# Patient Record
(demographics unavailable — no encounter records)

---

## 2024-10-11 NOTE — RISK ASSESSMENT
[No, patient denies ideation or behavior] : No, patient denies ideation or behavior [No] : No [FreeTextEntry9] :  Pt denied a/v/h, denied s/h/i, denied NSSIB, and denied recent use of substances.

## 2024-10-11 NOTE — REASON FOR VISIT
[Other Location: e.g. Home (Enter Location, City,State)___] : The provider was located at [unfilled]. [Patient with collateral] : Patient with collateral  [Family Member] : family member [Patient preference] : as per patient preference [Telehealth (audio & video) - Individual/Group] : This visit was provided via telehealth using real-time 2-way audio visual technology. [Home] : The patient, [unfilled], was located at home, [unfilled], at the time of the visit. [Other: ______] : provided by MIKHAIL [Time Spent: ____ minutes] : Total time spent using  services: [unfilled] minutes. The patient's primary language is not English thus required  services. [FreeTextEntry4] : 10:23 AM [FreeTextEntry5] : 10:51 AM [Interpreters_IDNumber] : [id number] [Interpreters_FullName] : Palmer [TWNoteComboBox1] : Macedonian [TextBox_17] : Cyndie Morgan, Daughter, [phone]  [FreeTextEntry1] : Psychotherapy follow-up visit with the treatment diagnoses of  1) (311) (F32.A) Depression 2) (300.01) (F41.0) Panic disorder

## 2024-10-11 NOTE — PLAN
[[facility]] : [facility]  [Motivational Interviewing] : Motivational Interviewing  [Psychoeducation] : Psychoeducation  [Supportive Therapy] : Supportive Therapy [FreeTextEntry2] : Treatment Goal (effective as of 4/8/24):  Barbara will gain x3 triggers and x3 coping skills to regulate emotions adequately and communicate her thoughts and feelings effectively to others.  Objective A. Pt will use x1-3 coping skills daily to regulate thoughts and emotions.  Objective B. Pt will review mood-altering events in session every 1-4 weeks to process triggers and coping skill applications to gain self-awareness and insights.  Objective C. Pt will maintain medication management as prescribed daily and attend all scheduled appointments (Medication Management: Every 1-3 months & Individual Therapy: Every 1-4 weeks).  [de-identified] : Barbara confirmed taking medications as prescribed daily and attending all scheduled appointments (medication management every 1-3 months & individual therapy every 1-3 weeks). Since the previous session, Barbara reported feeling better and denied having any significant mood-altering events. We discussed the helping elements. Barbara reported practicing the breathing exercises more regularly and trying to have positive thoughts. She denied having any specific anxiety-provoking thoughts. Also, she began to sleep over at her daughter's house and talk more with her family in [place]. The therapist validated her efforts and highlighted support and love from her family to her using reflective listening. Barbara will continue with 4-4-6 tension-release breathing exercises (daily, x3-5), TIPs skills (using cold temperature) when feeling anxious, and use the mantra, "I am not stuck," x3-5 daily to expand her approaches and attitudes toward her work stress. We will discuss the outcome and other helping elements in the next session.    Plan/Practice Task(s) in Progress: - Tension-release breathing exercises 4-4-6 (x2 daily): Inhale for 4 seconds, hold the air for 4 seconds, exhale for 4 seconds). - TIPPS (Temperature, cold): Putting ice on the back of her neck, holding ice, washing face with cold water - Problem-solving skills: Reviewing the list of Pros vs. Cons - Using the mantra, "I am not stuck," x3-5 daily to expand her approaches and attitudes toward her work stress.    Plan/Practice Task(s) in Progress: - Tension-release breathing exercises 4-4-6 (x2 daily): Inhale for 4 seconds, hold the air for 4 seconds, exhale for 4 seconds).  - TIPPS (Temperature, cold): Putting ice on the back of neck, holding ice, washing face with cold water - Problem-solving skills: Reviewing the list of Pros vs. Cons  Skills Training (all types):  - Tension-release breathing exercises 4-4-6 (x2 daily): Inhale for 4 seconds, hold the air for 4 seconds, exhale for 4 seconds).  - Problem-solving skills: Considering options by brainstorming - Using self-talk to self-soothe and prioritize self-care - Threat appraisal skills:1) Big danger? 2) Catastrophic possibilities? 3) More than I can cope with (consider past positive outcomes)?   Recommended services & referrals made: - 3/25/24: [facility] [phone], Pt was receptive  Support Network (established contact consent/PHI Release): - Emergency and Collateral Contact: Cyndie Morgan, Daughter, [phone]   Follow-up: - Return in 4 week(s).

## 2024-10-28 NOTE — DISCUSSION/SUMMARY
[FreeTextEntry1] : Patient is a 56-year-old female, single, originally from [place] but living in the [place] since 2004, Ukrainian-speaking, has two adult children (son in [place] and daughter in [place]), currently domiciled with daughter and family, formerly employed as a nanny, per chart w/ PMHx of diabetes and obstructive sleep apnea not on CPAP/BiPAP, w/ PPHx of 1 [facility]PP admission in February 2024 for suspected suicide attempt (OD on nine Tylenol PM - patient denies it was a SA), medication trial history of Paxil 10 mg for unclear time period prior to IPP admission in 2024, per chart no known substance use history, no family psychiatric history, who presents to [facility] for continuing follow-up for medication management.  On continued psychiatric evaluation, patient is endorsing improving mood and improving anxiety in the context of ADLs and stress at work and in the home. Denies suicidal, psychotic, manic, delusional, or paranoid ideations. Patient's panic attacks improved per patient. She reported some increased anxiety and headaches on Zoloft, and dry mouth from Paxil, which influenced her decisions to stop taking the medication. Discussed risks and benefits of recurrence of mood and or anxiety symptoms without medication and patient expressed understanding was started on 5mg of Lexapro to target symptoms. Plan to continue Lexapro at this time. Plan to also continue longitudinal care at the clinic with psychotherapy at this time. Patient is stable for outpatient level of care.

## 2024-10-28 NOTE — PLAN
[No] : No [Medication education provided] : Medication education provided. [FreeTextEntry5] : - c/w Lexapro 5mg PO daily ---Self discontinued Zoloft (and Trazodone) due to reported side effects amongst reported improved in mood and anxiety  ---Was initially discharged on Paxil from [facility] in 2024, but reported side effects so was cross tapered to Zoloft at [facility] - c/w psychotherapy with therapist at the clinic - Follow up appointment in 4 weeks (Sept 26th at 9AM)

## 2024-10-28 NOTE — HISTORY OF PRESENT ILLNESS
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 [FreeTextEntry2] : Past psychiatric history of 1 IPP admission in February 2024 for suspected suicide attempt, prior suicide history of one suspected suicide attempt, medication trial history of Paxil 10 mg for unclear time period prior to IPP admission.  Patient states she was started on Paxil 10 mg for sleep a few years ago (exact time unknown). States she started Paxil coinciding with also starting to have panic attacks around 3 years ago, which she describes as a feeling of increased anxiety, dry mouth, heart beating fast, difficulty breathing. States her panic attacks occur daily. Denies knowing what the triggers are. States they started when she was preparing to travel to [place]. Reports Paxil had initially helped her anxiety and panic attacks but there were times when she took it daily, felt better, and then stopped it which caused rebound anxiety. Denies any other medication trials.   [FreeTextEntry3] : Paxil 10 mg (few years, did not always take daily), Paxil 20 mg (on 20 mg for about 2 weeks). Zoloft 50 mg (on for about 7 weeks; experienced increased anxiety and headaches)

## 2024-10-28 NOTE — PHYSICAL EXAM
[Well groomed] : well groomed [Intermittent] : intermittent [Cooperative] : cooperative [Euthymic] : euthymic [Full] : full [Soft] : soft [Linear/Goal Directed] : linear/goal directed [None] : none [None Reported] : none reported [Average] : average [WNL] : within normal limits [FreeTextEntry8] : "everythings good" [de-identified] : has a negative opinion of medication for psychiatric d/o, seemingly due to stigma

## 2024-11-01 NOTE — REASON FOR VISIT
[Patient preference] : as per patient preference [Telehealth (audio & video) - Individual/Group] : This visit was provided via telehealth using real-time 2-way audio visual technology. [Other Location: e.g. Home (Enter Location, City,State)___] : The provider was located at [unfilled]. [Home] : The patient, [unfilled], was located at home, [unfilled], at the time of the visit. [Verbal consent obtained from patient/other participant(s)] : Verbal consent for telehealth/telephonic services obtained from patient/other participant(s) [Patient] : Patient [Other: ______] : provided by MIKHAIL [Time Spent: ____ minutes] : Total time spent using  services: [unfilled] minutes. The patient's primary language is not English thus required  services. [FreeTextEntry4] : 11:04 AM [FreeTextEntry5] : 11:40 AM [TWNoteComboBox1] : Kinyarwanda [Interpreters_IDNumber] : [id number] [Interpreters_FullName] : "Kayce" [FreeTextEntry3] : Guinean  [FreeTextEntry1] : Psychotherapy follow-up visit with the treatment diagnoses of  1) (311) (F32.A) Depression 2) (300.01) (F41.0) Panic disorder

## 2024-11-01 NOTE — PLAN
[[facility]] : [facility]  [Motivational Interviewing] : Motivational Interviewing  [Psychoeducation] : Psychoeducation  [Supportive Therapy] : Supportive Therapy [FreeTextEntry2] : Treatment Goal (effective as of 4/8/24):  Barbara will gain x3 triggers and x3 coping skills to regulate emotions adequately and communicate her thoughts and feelings effectively to others.  Objective A. Pt will use x1-3 coping skills daily to regulate thoughts and emotions.  Objective B. Pt will review mood-altering events in session every 1-4 weeks to process triggers and coping skill applications to gain self-awareness and insights.  Objective C. Pt will maintain medication management as prescribed daily and attend all scheduled appointments (Medication Management: Every 1-3 months & Individual Therapy: Every 1-4 weeks).  [de-identified] : Barbara confirmed taking medications as prescribed daily and attending all scheduled appointments (medication management every 1-3 months & individual therapy every 1-3 weeks). Since the previous session, Barbara reported feeling better and denied having any significant mood-altering events. We reviewed the helping elements to acknowledge her progress. Barbara reported practicing the breathing exercises more regularly and using the mantra we had discussed in the previous session, "I am not stuck, I am making my own decisions, etc." We briefly discussed her plan for her current job when her boss moved to [place], which also empowered her. Also, she continues sleeping over at her daughter's house and talking more with her family in [place]. The therapist validated her efforts and highlighted her ability to redirect her worrying thoughts. Barbara will continue with 4-4-6 tension-release breathing exercises (daily, x3-5), TIPs skills (using cold temperature) when feeling anxious, and use the mantra, "I am not stuck," x3-5 daily to expand her approaches and attitudes toward her work stress. We will discuss her progress and other helping elements in the next session.    Plan/Practice Task(s) in Progress: - Tension-release breathing exercises 4-4-6 (x2 daily): Inhale for 4 seconds, hold the air for 4 seconds, exhale for 4 seconds). - TIPPS (Temperature, cold): Putting ice on the back of her neck, holding ice, washing face with cold water - Problem-solving skills: Reviewing the list of Pros vs. Cons - Using the mantra, "I am not stuck," x3-5 daily to expand her approaches and attitudes toward her work stress.    Plan/Practice Task(s) in Progress: - Tension-release breathing exercises 4-4-6 (x2 daily): Inhale for 4 seconds, hold the air for 4 seconds, exhale for 4 seconds).  - TIPPS (Temperature, cold): Putting ice on the back of neck, holding ice, washing face with cold water - Problem-solving skills: Reviewing the list of Pros vs. Cons  Skills Training (all types):  - Tension-release breathing exercises 4-4-6 (x2 daily): Inhale for 4 seconds, hold the air for 4 seconds, exhale for 4 seconds).  - Problem-solving skills: Considering options by brainstorming - Using self-talk to self-soothe and prioritize self-care - Threat appraisal skills:1) Big danger? 2) Catastrophic possibilities? 3) More than I can cope with (consider past positive outcomes)?   Recommended services & referrals made: - 3/25/24: [facility] [phone], Pt was receptive - Legal services via C[employer]: [employer],  [facility], [facility]  Support Network (established contact consent/PHI Release): - Emergency and Collateral Contact: Cyndie Morgan, Daughter, [phone]   Follow-up: - Return in 4 week(s).

## 2024-11-01 NOTE — END OF VISIT
[Duration of Psychotherapy Visit (minutes spent in synchronous communication): ____] : Duration of Psychotherapy Visit (minutes spent in synchronous communication): [unfilled] [Individual Psychotherapy for 16-37 minutes] : Individual Psychotherapy for 16-37 minutes [Licensed Clinician] : Licensed Clinician [Screening Provided] : Screening provided [FreeTextEntry1] : GAD[facility]

## 2024-11-25 NOTE — PLAN
[[facility]] : [facility]  [Motivational Interviewing] : Motivational Interviewing  [Psychoeducation] : Psychoeducation  [Supportive Therapy] : Supportive Therapy [FreeTextEntry2] : Treatment Goal (effective as of 4/8/24):  Barbara will gain x3 triggers and x3 coping skills to regulate emotions adequately and communicate her thoughts and feelings effectively to others.  Objective A. Pt will use x1-3 coping skills daily to regulate thoughts and emotions.  Objective B. Pt will review mood-altering events in session every 1-4 weeks to process triggers and coping skill applications to gain self-awareness and insights.  Objective C. Pt will maintain medication management as prescribed daily and attend all scheduled appointments (Medication Management: Every 1-3 months & Individual Therapy: Every 1-4 weeks).  [de-identified] : Barbara is building self-awareness to identify x3 triggers and x3 coping skills that she can use to manage her thoughts and emotions to communicate her thoughts and feelings properly with others. She reported improved communication with her family but still struggles to express her needs at work. Today, we continued to discuss her work-related stressors and coping skills to set healthy boundaries with her boss. The therapist used open-ended questions, reflective listening, and validation to support the patient in openly discussing recent events at work, mainly when her boss added new/challenging work tasks. We discussed her concerns, including safety-related issues and the consequences of her medical conditions. We reviewed the helping thoughts/mantra she had created in the previous session, "I am not stuck, I am making my own decisions, etc."  Barbara showed making progress toward her treatment goal by reporting feeling better overall, PHQ9 score showing a low score, using x1-2 coping skills to reduce bodily tension (breathing exercises, self-talk, etc.), taking medications as prescribed daily, and attending all scheduled appointments (medication management every 1-3 months & individual therapy every 1-3 weeks). Barbara will continue with 4-4-6 tension-release breathing exercises (daily, x3-5), TIPs skills (using cold temperature) when feeling anxious, and use the mantra, "I am not stuck," x3-5 daily to expand her approaches and attitudes toward her work stress. We will discuss her progress and other helping elements in the next session.   Plan/Practice Task(s) in Progress: - Tension-release breathing exercises 4-4-6 (x2 daily): Inhale for 4 seconds, hold the air for 4 seconds, exhale for 4 seconds). - TIPPS (Temperature, cold): Putting ice on the back of her neck, holding ice, washing face with cold water - Problem-solving skills: Reviewing the list of Pros vs. Cons - Using the mantra, "I am not stuck," x3-5 daily to expand her approaches and attitudes toward her work stress.    Plan/Practice Task(s) in Progress: - Tension-release breathing exercises 4-4-6 (x2 daily): Inhale for 4 seconds, hold the air for 4 seconds, exhale for 4 seconds).  - TIPPS (Temperature, cold): Putting ice on the back of neck, holding ice, washing face with cold water - Problem-solving skills: Reviewing the list of Pros vs. Cons  Skills Training (all types):  - Tension-release breathing exercises 4-4-6 (x2 daily): Inhale for 4 seconds, hold the air for 4 seconds, exhale for 4 seconds).  - Problem-solving skills: Considering options by brainstorming - Using self-talk to self-soothe and prioritize self-care - Threat appraisal skills:1) Big danger? 2) Catastrophic possibilities? 3) More than I can cope with (consider past positive outcomes)?   Recommended services & referrals made: - 3/25/24: [facility] [phone], Pt was receptive - Legal services via C[employer]: [employer],  [employer], [place] [facility]  Support Network (established contact consent/PHI Release): - Emergency and Collateral Contact: Cyndie Morgan, Daughter, [phone]   Follow-up: - Return in 3 week(s).

## 2024-11-25 NOTE — RISK ASSESSMENT
[No, patient denies ideation or behavior] : No, patient denies ideation or behavior [No] : No [FreeTextEntry9] :  Pt denied a/v/h, denied s/h/i, denied NSSIB, and denied recent use of substances. Show How Many Months Of Anticipated Therapy Are Left: No

## 2024-11-25 NOTE — END OF VISIT
[Screening Provided] : Screening provided [Duration of Psychotherapy Visit (minutes spent in synchronous communication): ____] : Duration of Psychotherapy Visit (minutes spent in synchronous communication): [unfilled] [Licensed Clinician] : Licensed Clinician [Individual Psychotherapy for 38-52 minutes] : Individual Psychotherapy for 38 - 52 minutes [FreeTextEntry1] : PHQ9

## 2024-11-25 NOTE — PLAN
[[facility]] : [facility]  [Motivational Interviewing] : Motivational Interviewing  [Psychoeducation] : Psychoeducation  [Supportive Therapy] : Supportive Therapy [FreeTextEntry2] : Treatment Goal (effective as of 4/8/24):  Barbara will gain x3 triggers and x3 coping skills to regulate emotions adequately and communicate her thoughts and feelings effectively to others.  Objective A. Pt will use x1-3 coping skills daily to regulate thoughts and emotions.  Objective B. Pt will review mood-altering events in session every 1-4 weeks to process triggers and coping skill applications to gain self-awareness and insights.  Objective C. Pt will maintain medication management as prescribed daily and attend all scheduled appointments (Medication Management: Every 1-3 months & Individual Therapy: Every 1-4 weeks).  [de-identified] : Barbara is building self-awareness to identify x3 triggers and x3 coping skills that she can use to manage her thoughts and emotions to communicate her thoughts and feelings properly with others. She reported improved communication with her family but still struggles to express her needs at work. Today, we continued to discuss her work-related stressors and coping skills to set healthy boundaries with her boss. The therapist used open-ended questions, reflective listening, and validation to support the patient in openly discussing recent events at work, mainly when her boss added new/challenging work tasks. We discussed her concerns, including safety-related issues and the consequences of her medical conditions. We reviewed the helping thoughts/mantra she had created in the previous session, "I am not stuck, I am making my own decisions, etc."  Barbara showed making progress toward her treatment goal by reporting feeling better overall, PHQ9 score showing a low score, using x1-2 coping skills to reduce bodily tension (breathing exercises, self-talk, etc.), taking medications as prescribed daily, and attending all scheduled appointments (medication management every 1-3 months & individual therapy every 1-3 weeks). Barbara will continue with 4-4-6 tension-release breathing exercises (daily, x3-5), TIPs skills (using cold temperature) when feeling anxious, and use the mantra, "I am not stuck," x3-5 daily to expand her approaches and attitudes toward her work stress. We will discuss her progress and other helping elements in the next session.   Plan/Practice Task(s) in Progress: - Tension-release breathing exercises 4-4-6 (x2 daily): Inhale for 4 seconds, hold the air for 4 seconds, exhale for 4 seconds). - TIPPS (Temperature, cold): Putting ice on the back of her neck, holding ice, washing face with cold water - Problem-solving skills: Reviewing the list of Pros vs. Cons - Using the mantra, "I am not stuck," x3-5 daily to expand her approaches and attitudes toward her work stress.    Plan/Practice Task(s) in Progress: - Tension-release breathing exercises 4-4-6 (x2 daily): Inhale for 4 seconds, hold the air for 4 seconds, exhale for 4 seconds).  - TIPPS (Temperature, cold): Putting ice on the back of neck, holding ice, washing face with cold water - Problem-solving skills: Reviewing the list of Pros vs. Cons  Skills Training (all types):  - Tension-release breathing exercises 4-4-6 (x2 daily): Inhale for 4 seconds, hold the air for 4 seconds, exhale for 4 seconds).  - Problem-solving skills: Considering options by brainstorming - Using self-talk to self-soothe and prioritize self-care - Threat appraisal skills:1) Big danger? 2) Catastrophic possibilities? 3) More than I can cope with (consider past positive outcomes)?   Recommended services & referrals made: - 3/25/24: [facility] [phone], Pt was receptive - Legal services via [employer]: [employer],  [employer], [place] [facility]  Support Network (established contact consent/PHI Release): - Emergency and Collateral Contact: Cyndie Morgan, Daughter, [phone]   Follow-up: - Return in 3 week(s).

## 2024-11-25 NOTE — REASON FOR VISIT
[Patient preference] : as per patient preference [Telehealth (audio & video) - Individual/Group] : This visit was provided via telehealth using real-time 2-way audio visual technology. [Other Location: e.g. Home (Enter Location, City,State)___] : The provider was located at [unfilled]. [Home] : The patient, [unfilled], was located at home, [unfilled], at the time of the visit. [Verbal consent obtained from patient/other participant(s)] : Verbal consent for telehealth/telephonic services obtained from patient/other participant(s) [Patient] : Patient [Other: ______] : provided by MIKHAIL [FreeTextEntry4] : 11:00 AM [FreeTextEntry5] : 11:50 AM [TWNoteComboBox1] : Maori [Interpreters_IDNumber] : [id number] [Interpreters_FullName] : "Kayce" [FreeTextEntry3] : Afghan  [FreeTextEntry1] : Psychotherapy follow-up visit with the treatment diagnoses of  1) (311) (F32.A) Depression 2) (300.01) (F41.0) Panic disorder

## 2024-11-25 NOTE — REASON FOR VISIT
[Patient preference] : as per patient preference [Telehealth (audio & video) - Individual/Group] : This visit was provided via telehealth using real-time 2-way audio visual technology. [Other Location: e.g. Home (Enter Location, City,State)___] : The provider was located at [unfilled]. [Home] : The patient, [unfilled], was located at home, [unfilled], at the time of the visit. [Verbal consent obtained from patient/other participant(s)] : Verbal consent for telehealth/telephonic services obtained from patient/other participant(s) [Patient] : Patient [Other: ______] : provided by MIKHAIL [FreeTextEntry4] : 11:00 AM [FreeTextEntry5] : 11:50 AM [TWNoteComboBox1] : English [Interpreters_IDNumber] : [id number] [Interpreters_FullName] : "Kayce" [FreeTextEntry3] : Stateless  [FreeTextEntry1] : Psychotherapy follow-up visit with the treatment diagnoses of  1) (311) (F32.A) Depression 2) (300.01) (F41.0) Panic disorder

## 2024-12-05 NOTE — ASSESSMENT
[FreeTextEntry1] : big improvement in diabetes control, but ldl not at goal, and A1C 7.3, will adjust meds, try increasing ozempic to 2 mg. weekly.

## 2024-12-12 NOTE — REASON FOR VISIT
[Patient preference] : as per patient preference [Telehealth (audio & video) - Individual/Group] : This visit was provided via telehealth using real-time 2-way audio visual technology. [Other Location: e.g. Home (Enter Location, City,State)___] : The provider was located at [unfilled]. [Home] : The patient, [unfilled], was located at home, [unfilled], at the time of the visit. [Verbal consent obtained from patient/other participant(s)] : Verbal consent for telehealth/telephonic services obtained from patient/other participant(s) [Patient] : Patient [Other: ______] : provided by MIKHAIL [Time Spent: ____ minutes] : Total time spent using  services: [unfilled] minutes. The patient's primary language is not English thus required  services. [FreeTextEntry4] : 11:27 AM [FreeTextEntry5] : 12:14 AM [TWNoteComboBox1] : Italian [Interpreters_IDNumber] : [id number] [FreeTextEntry3] : Citizen of [place]  [Interpreters_FullName] : "Kayce" [FreeTextEntry1] : Psychotherapy follow-up visit with the treatment diagnoses of  1) (311) (F32.A) Depression 2) (300.01) (F41.0) Panic disorder

## 2024-12-12 NOTE — END OF VISIT
[Screening Provided] : Screening provided [Duration of Psychotherapy Visit (minutes spent in synchronous communication): ____] : Duration of Psychotherapy Visit (minutes spent in synchronous communication): [unfilled] [Individual Psychotherapy for 38-52 minutes] : Individual Psychotherapy for 38 - 52 minutes [Licensed Clinician] : Licensed Clinician [FreeTextEntry1] : PHQ9

## 2024-12-12 NOTE — PLAN
[[facility]] : [facility]  [Motivational Interviewing] : Motivational Interviewing  [Psychoeducation] : Psychoeducation  [Supportive Therapy] : Supportive Therapy [FreeTextEntry2] : Treatment Goal (effective as of 4/8/24):  Barbara will identify x3 triggers and x3 coping skills to regulate emotions adequately and communicate her thoughts and feelings effectively to others.  Objective A. Pt will use x1-3 coping skills daily to regulate thoughts and emotions.  Objective B. Pt will review mood-altering events in session every 1-4 weeks to process triggers and coping skill applications to gain self-awareness and insights.  Objective C. Pt will maintain medication management as prescribed daily and attend all scheduled appointments (Medication Management: Every 1-3 months & Individual Therapy: Every 1-4 weeks).  [de-identified] : Barbara is building insights to identify x3 triggers and x3 coping skills that she can use to manage her thoughts and emotions, and to communicate her thoughts and feelings with others. The patient wants to process her emotions and thoughts with the therapist to improve her insights and strengthen her emotional resilience. Her primary stressors are relational conflicts and setting healthy boundaries.   Today, Barbara reported overall better mood but was concerned about her recent forgetfulness and confusion. For example, Barbara was scheduled for an 11:15 AM in-person appointment with the therapist, but she got confused with the appointments. She came to the clinic around 9 AM to see Dr. York. We switched the session from in-person to telehealth after we began our conversation on the phone using  services after she did not show up for our session at the clinic. Barbara expressed having a couple of episodes of getting confused with tasks or directions recently. She agreed to share her concerns with Dr. York during the session on Monday, 12/16/24. We discussed coping strategies to organize her appointments and essential tasks using visual tools, such as paper calendars, post-its, taking notes, etc.   Barbara showed making progress toward her treatment goal by reporting feeling better overall, PHQ9 score showing a low score, using x1-2 coping skills to reduce bodily tension (breathing exercises, self-talk, etc.), taking medications as prescribed daily, and attending all scheduled appointments (medication management every 1-3 months & individual therapy every 1-3 weeks). Barbara will continue with 4-4-6 tension-release breathing exercises (daily, x3-5), TIPs skills (using cold temperature) when feeling anxious, and use the mantra, "I am not stuck," x3-5 daily to expand her approaches and attitudes toward her work stress. We will continue to discuss her recent symptoms in the next session while exploring organizing skills from today's session until our next session.     Plan/Practice Task(s) in Progress: - Tension-release breathing exercises 4-4-6 (x2 daily): Inhale for 4 seconds, hold the air for 4 seconds, exhale for 4 seconds). - TIPPS (Temperature, cold): Putting ice on the back of her neck, holding ice, washing face with cold water - Problem-solving skills: Reviewing the list of Pros vs. Cons - Using the mantra, "I am not stuck," x3-5 daily to expand her approaches and attitudes toward her work stress.    Plan/Practice Task(s) in Progress: - Tension-release breathing exercises 4-4-6 (x2 daily): Inhale for 4 seconds, hold the air for 4 seconds, exhale for 4 seconds).  - TIPPS (Temperature, cold): Putting ice on the back of neck, holding ice, washing face with cold water - Problem-solving skills: Reviewing the list of Pros vs. Cons  Skills Training (all types):  - Tension-release breathing exercises 4-4-6 (x2 daily): Inhale for 4 seconds, hold the air for 4 seconds, exhale for 4 seconds).  - Problem-solving skills: Considering options by brainstorming - Using self-talk to self-soothe and prioritize self-care - Threat appraisal skills:1) Big danger? 2) Catastrophic possibilities? 3) More than I can cope with (consider past positive outcomes)?   Recommended services & referrals made: - 3/25/24: [facility] [phone], Pt was receptive - Legal services via [employer]: [employer],  [facility], [facility]  Support Network (established contact consent/PHI Release): - Emergency and Collateral Contact: Cyndie Morgan, Daughter, [phone]   Follow-up: - Return in 3 week(s).

## 2024-12-12 NOTE — PLAN
[[facility]] : [facility]  [Motivational Interviewing] : Motivational Interviewing  [Psychoeducation] : Psychoeducation  [Supportive Therapy] : Supportive Therapy [FreeTextEntry2] : Treatment Goal (effective as of 4/8/24):  Barbara will identify x3 triggers and x3 coping skills to regulate emotions adequately and communicate her thoughts and feelings effectively to others.  Objective A. Pt will use x1-3 coping skills daily to regulate thoughts and emotions.  Objective B. Pt will review mood-altering events in session every 1-4 weeks to process triggers and coping skill applications to gain self-awareness and insights.  Objective C. Pt will maintain medication management as prescribed daily and attend all scheduled appointments (Medication Management: Every 1-3 months & Individual Therapy: Every 1-4 weeks).  [de-identified] : Barbara is building insights to identify x3 triggers and x3 coping skills that she can use to manage her thoughts and emotions, and to communicate her thoughts and feelings with others. The patient wants to process her emotions and thoughts with the therapist to improve her insights and strengthen her emotional resilience. Her primary stressors are relational conflicts and setting healthy boundaries.   Today, Barbara reported overall better mood but was concerned about her recent forgetfulness and confusion. For example, Barbara was scheduled for an 11:15 AM in-person appointment with the therapist, but she got confused with the appointments. She came to the clinic around 9 AM to see Dr. York. We switched the session from in-person to telehealth after we began our conversation on the phone using  services after she did not show up for our session at the clinic. Barbara expressed having a couple of episodes of getting confused with tasks or directions recently. She agreed to share her concerns with Dr. York during the session on Monday, 12/16/24. We discussed coping strategies to organize her appointments and essential tasks using visual tools, such as paper calendars, post-its, taking notes, etc.   Barbara showed making progress toward her treatment goal by reporting feeling better overall, PHQ9 score showing a low score, using x1-2 coping skills to reduce bodily tension (breathing exercises, self-talk, etc.), taking medications as prescribed daily, and attending all scheduled appointments (medication management every 1-3 months & individual therapy every 1-3 weeks). Barbara will continue with 4-4-6 tension-release breathing exercises (daily, x3-5), TIPs skills (using cold temperature) when feeling anxious, and use the mantra, "I am not stuck," x3-5 daily to expand her approaches and attitudes toward her work stress. We will continue to discuss her recent symptoms in the next session while exploring organizing skills from today's session until our next session.     Plan/Practice Task(s) in Progress: - Tension-release breathing exercises 4-4-6 (x2 daily): Inhale for 4 seconds, hold the air for 4 seconds, exhale for 4 seconds). - TIPPS (Temperature, cold): Putting ice on the back of her neck, holding ice, washing face with cold water - Problem-solving skills: Reviewing the list of Pros vs. Cons - Using the mantra, "I am not stuck," x3-5 daily to expand her approaches and attitudes toward her work stress.    Plan/Practice Task(s) in Progress: - Tension-release breathing exercises 4-4-6 (x2 daily): Inhale for 4 seconds, hold the air for 4 seconds, exhale for 4 seconds).  - TIPPS (Temperature, cold): Putting ice on the back of neck, holding ice, washing face with cold water - Problem-solving skills: Reviewing the list of Pros vs. Cons  Skills Training (all types):  - Tension-release breathing exercises 4-4-6 (x2 daily): Inhale for 4 seconds, hold the air for 4 seconds, exhale for 4 seconds).  - Problem-solving skills: Considering options by brainstorming - Using self-talk to self-soothe and prioritize self-care - Threat appraisal skills:1) Big danger? 2) Catastrophic possibilities? 3) More than I can cope with (consider past positive outcomes)?   Recommended services & referrals made: - 3/25/24: [facility] [phone], Pt was receptive - Legal services via C[employer]: [employer],  [facility], [facility]  Support Network (established contact consent/PHI Release): - Emergency and Collateral Contact: Cyndie Morgan, Daughter, [phone]   Follow-up: - Return in 3 week(s).

## 2024-12-12 NOTE — PLAN
[[facility]] : [facility]  [Motivational Interviewing] : Motivational Interviewing  [Psychoeducation] : Psychoeducation  [Supportive Therapy] : Supportive Therapy [FreeTextEntry2] : Treatment Goal (effective as of 4/8/24):  Barbara will identify x3 triggers and x3 coping skills to regulate emotions adequately and communicate her thoughts and feelings effectively to others.  Objective A. Pt will use x1-3 coping skills daily to regulate thoughts and emotions.  Objective B. Pt will review mood-altering events in session every 1-4 weeks to process triggers and coping skill applications to gain self-awareness and insights.  Objective C. Pt will maintain medication management as prescribed daily and attend all scheduled appointments (Medication Management: Every 1-3 months & Individual Therapy: Every 1-4 weeks).  [de-identified] : Barbara is building insights to identify x3 triggers and x3 coping skills that she can use to manage her thoughts and emotions, and to communicate her thoughts and feelings with others. The patient wants to process her emotions and thoughts with the therapist to improve her insights and strengthen her emotional resilience. Her primary stressors are relational conflicts and setting healthy boundaries.   Today, Barbara reported overall better mood but was concerned about her recent forgetfulness and confusion. For example, Barbara was scheduled for an 11:15 AM in-person appointment with the therapist, but she got confused with the appointments. She came to the clinic around 9 AM to see Dr. York. We switched the session from in-person to telehealth after we began our conversation on the phone using  services after she did not show up for our session at the clinic. Barbara expressed having a couple of episodes of getting confused with tasks or directions recently. She agreed to share her concerns with Dr. York during the session on Monday, 12/16/24. We discussed coping strategies to organize her appointments and essential tasks using visual tools, such as paper calendars, post-its, taking notes, etc.   Barbara showed making progress toward her treatment goal by reporting feeling better overall, PHQ9 score showing a low score, using x1-2 coping skills to reduce bodily tension (breathing exercises, self-talk, etc.), taking medications as prescribed daily, and attending all scheduled appointments (medication management every 1-3 months & individual therapy every 1-3 weeks). Barbara will continue with 4-4-6 tension-release breathing exercises (daily, x3-5), TIPs skills (using cold temperature) when feeling anxious, and use the mantra, "I am not stuck," x3-5 daily to expand her approaches and attitudes toward her work stress. We will continue to discuss her recent symptoms in the next session while exploring organizing skills from today's session until our next session.     Plan/Practice Task(s) in Progress: - Tension-release breathing exercises 4-4-6 (x2 daily): Inhale for 4 seconds, hold the air for 4 seconds, exhale for 4 seconds). - TIPPS (Temperature, cold): Putting ice on the back of her neck, holding ice, washing face with cold water - Problem-solving skills: Reviewing the list of Pros vs. Cons - Using the mantra, "I am not stuck," x3-5 daily to expand her approaches and attitudes toward her work stress.    Plan/Practice Task(s) in Progress: - Tension-release breathing exercises 4-4-6 (x2 daily): Inhale for 4 seconds, hold the air for 4 seconds, exhale for 4 seconds).  - TIPPS (Temperature, cold): Putting ice on the back of neck, holding ice, washing face with cold water - Problem-solving skills: Reviewing the list of Pros vs. Cons  Skills Training (all types):  - Tension-release breathing exercises 4-4-6 (x2 daily): Inhale for 4 seconds, hold the air for 4 seconds, exhale for 4 seconds).  - Problem-solving skills: Considering options by brainstorming - Using self-talk to self-soothe and prioritize self-care - Threat appraisal skills:1) Big danger? 2) Catastrophic possibilities? 3) More than I can cope with (consider past positive outcomes)?   Recommended services & referrals made: - 3/25/24: [facility] [phone], Pt was receptive - Legal services via CBO: [employer],  [facility], [facility]  Support Network (established contact consent/PHI Release): - Emergency and Collateral Contact: Cyndie Morgan, Daughter, [phone]   Follow-up: - Return in 3 week(s).

## 2024-12-12 NOTE — REASON FOR VISIT
[Patient preference] : as per patient preference [Telehealth (audio & video) - Individual/Group] : This visit was provided via telehealth using real-time 2-way audio visual technology. [Other Location: e.g. Home (Enter Location, City,State)___] : The provider was located at [unfilled]. [Home] : The patient, [unfilled], was located at home, [unfilled], at the time of the visit. [Verbal consent obtained from patient/other participant(s)] : Verbal consent for telehealth/telephonic services obtained from patient/other participant(s) [Patient] : Patient [Other: ______] : provided by MIKHAIL [Time Spent: ____ minutes] : Total time spent using  services: [unfilled] minutes. The patient's primary language is not English thus required  services. [FreeTextEntry4] : 11:27 AM [FreeTextEntry5] : 12:14 AM [TWNoteComboBox1] : Hebrew [Interpreters_IDNumber] : [id number] [Interpreters_FullName] : "Kayce" [FreeTextEntry3] : Vincentian  [FreeTextEntry1] : Psychotherapy follow-up visit with the treatment diagnoses of  1) (311) (F32.A) Depression 2) (300.01) (F41.0) Panic disorder

## 2024-12-12 NOTE — REASON FOR VISIT
[Patient preference] : as per patient preference [Telehealth (audio & video) - Individual/Group] : This visit was provided via telehealth using real-time 2-way audio visual technology. [Other Location: e.g. Home (Enter Location, City,State)___] : The provider was located at [unfilled]. [Home] : The patient, [unfilled], was located at home, [unfilled], at the time of the visit. [Verbal consent obtained from patient/other participant(s)] : Verbal consent for telehealth/telephonic services obtained from patient/other participant(s) [Patient] : Patient [Other: ______] : provided by MIKHAIL [Time Spent: ____ minutes] : Total time spent using  services: [unfilled] minutes. The patient's primary language is not English thus required  services. [FreeTextEntry4] : 11:27 AM [FreeTextEntry5] : 12:14 AM [TWNoteComboBox1] : Uzbek [Interpreters_IDNumber] : [id number] [FreeTextEntry3] : Bermudian  [Interpreters_FullName] : "Kayce" [FreeTextEntry1] : Psychotherapy follow-up visit with the treatment diagnoses of  1) (311) (F32.A) Depression 2) (300.01) (F41.0) Panic disorder

## 2024-12-16 NOTE — PHYSICAL EXAM
[Well groomed] : well groomed [Cooperative] : cooperative [Euthymic] : euthymic [Full] : full [Soft] : soft [Linear/Goal Directed] : linear/goal directed [None] : none [None Reported] : none reported [Average] : average [WNL] : within normal limits [de-identified] : Korean speaking [de-identified] : has a negative opinion of medication for psychiatric d/o, seemingly due to stigma

## 2024-12-16 NOTE — PLAN
[No] : No [Medication education provided] : Medication education provided. [FreeTextEntry5] : - c/w Lexapro 5mg PO daily ---Self discontinued Zoloft (and Trazodone) due to reported side effects amongst reported improved in mood and anxiety  ---Was initially discharged on Paxil from [facility] in 2024, but reported side effects so was cross tapered to Zoloft at [facility] - c/w psychotherapy with therapist at the clinic - Follow up appointment in 8 weeks (Thurs Feb 20th at 9AM)

## 2024-12-16 NOTE — REASON FOR VISIT
[Patient] : Patient [FreeTextEntry1] : Medication management; "I get very scared when I'm alone in the dark on the streets, I panic... I feel lost, I don't know how to explain"

## 2024-12-16 NOTE — HISTORY OF PRESENT ILLNESS
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 [FreeTextEntry2] : Past psychiatric history of 1 [facility] admission in February 2024 for suspected suicide attempt, prior suicide history of one suspected suicide attempt, medication trial history of Paxil 10 mg for unclear time period prior to [facility] admission.  Patient states she was started on Paxil 10 mg for sleep a few years ago (exact time unknown). States she started Paxil coinciding with also starting to have panic attacks around 3 years ago, which she describes as a feeling of increased anxiety, dry mouth, heart beating fast, difficulty breathing. States her panic attacks occur daily. Denies knowing what the triggers are. States they started when she was preparing to travel to [place]. Reports Paxil had initially helped her anxiety and panic attacks but there were times when she took it daily, felt better, and then stopped it which caused rebound anxiety. Denies any other medication trials.   [FreeTextEntry3] : Paxil 10 mg (few years, did not always take daily), Paxil 20 mg (on 20 mg for about 2 weeks). Zoloft 50 mg (on for about 7 weeks; experienced increased anxiety and headaches)

## 2024-12-16 NOTE — DISCUSSION/SUMMARY
[FreeTextEntry1] : Patient is a 57-year-old female, single, originally from [place] but living in the [place] since 2004, Wolof-speaking, has two adult children (son in [place] and daughter in [place]), currently domiciled with daughter and family, formerly employed as a nanny, per chart w/ PMHx of diabetes and obstructive sleep apnea not on CPAP/BiPAP, w/ PPHx of 1 [facility] admission in February 2024 for suspected suicide attempt (OD on nine Tylenol PM - patient denies it was a SA), medication trial history of Paxil 10 mg for unclear time period prior to [facility] admission in 2024, per chart no known substance use history, no family psychiatric history, who presents to [facility] for continuing follow-up for medication management.  On continued psychiatric evaluation, patient is endorsing several episodes of panic since the last visit, but not as severe of frequent as the past. Patient endorses overall improving mood and improving anxiety in the context of ADLs and stress at work and in the home, but does want to continue to address ongoing intermittent panic episodes. Denies suicidal, psychotic, manic, delusional, or paranoid ideations. She reported some increased anxiety and headaches on Zoloft, and dry mouth from Paxil, which influenced her decisions to stop taking the medication. Discussed risks and benefits of recurrence of mood and or anxiety symptoms without medication and patient expressed understanding was started on 5mg of Lexapro to target symptoms. Plan to continue Lexapro at this time. Plan to also continue longitudinal care at the clinic with psychotherapy at this time. Patient is stable for outpatient level of care.

## 2025-02-19 NOTE — HISTORY OF PRESENT ILLNESS
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 [FreeTextEntry2] : Past psychiatric history of 1 [facility] admission in February 2024 for suspected suicide attempt, prior suicide history of one suspected suicide attempt, medication trial history of Paxil 10 mg for unclear time period prior to IPP admission.  Patient states she was started on Paxil 10 mg for sleep a few years ago (exact time unknown). States she started Paxil coinciding with also starting to have panic attacks around 3 years ago, which she describes as a feeling of increased anxiety, dry mouth, heart beating fast, difficulty breathing. States her panic attacks occur daily. Denies knowing what the triggers are. States they started when she was preparing to travel to [place]. Reports Paxil had initially helped her anxiety and panic attacks but there were times when she took it daily, felt better, and then stopped it which caused rebound anxiety. Denies any other medication trials.   [FreeTextEntry3] : Paxil 10 mg (few years, did not always take daily), Paxil 20 mg (on 20 mg for about 2 weeks). Zoloft 50 mg (on for about 7 weeks; experienced increased anxiety and headaches)

## 2025-02-19 NOTE — PLAN
[No] : No [Medication education provided] : Medication education provided. [FreeTextEntry5] : - c/w Lexapro 5mg PO daily ---Self discontinued Zoloft (and Trazodone) due to reported side effects amongst reported improved in mood and anxiety  ---Was initially discharged on Paxil from [facility] in 2024, but reported side effects so was cross tapered to Zoloft at [facility] - c/w psychotherapy with therapist at the clinic - Follow up appointment in 8 weeks (Mon March 17th 2025 930AM)

## 2025-02-19 NOTE — PHYSICAL EXAM
[Well groomed] : well groomed [Cooperative] : cooperative [Euthymic] : euthymic [Full] : full [Soft] : soft [Linear/Goal Directed] : linear/goal directed [None] : none [None Reported] : none reported [Average] : average [WNL] : within normal limits [de-identified] : Irish speaking [de-identified] : has a negative opinion of medication for psychiatric d/o, seemingly due to stigma

## 2025-02-19 NOTE — DISCUSSION/SUMMARY
[FreeTextEntry1] : Patient is a 57-year-old female, single, originally from [place] but living in the [place] since [place], Thai-speaking, has two adult children (son in [place] and daughter in [place]), currently domiciled with daughter and family, formerly employed as a nanny, per chart w/ PMHx of diabetes and obstructive sleep apnea not on CPAP/BiPAP, w/ PPHx of 1 [facility]PP admission in February 2024 for suspected suicide attempt (OD on nine Tylenol PM - patient denies it was a SA), medication trial history of Paxil 10 mg for unclear time period prior to IPP admission in 2024, per chart no known substance use history, no family psychiatric history, who presents to [facility] for continuing follow-up for medication management.  On continued psychiatric evaluation, patient is endorsing several episodes of panic since the last visit, but not as severe of frequent as the past. Patient endorses overall improving mood and improving anxiety in the context of ADLs and stress at work and in the home, but does want to continue to address ongoing intermittent panic episodes. Denies suicidal, psychotic, manic, delusional, or paranoid ideations. She reported some increased anxiety and headaches on Zoloft, and dry mouth from Paxil, which influenced her decisions to stop taking the medication. Discussed risks and benefits of recurrence of mood and or anxiety symptoms without medication and patient expressed understanding was started on 5mg of Lexapro to target symptoms. Plan to continue Lexapro at this time. Plan to also continue longitudinal care at the clinic with psychotherapy at this time. Patient is stable for outpatient level of care.

## 2025-03-17 NOTE — PLAN
[No] : No [Medication education provided] : Medication education provided. [FreeTextEntry5] : - c/w Lexapro 5mg PO daily ---Self discontinued Zoloft (and Trazodone) due to reported side effects amongst reported improved in mood and anxiety  ---Was initially discharged on Paxil from [facility] in 2024, but reported side effects so was cross tapered to Zoloft at [facility]PD - c/w psychotherapy with therapist at the clinic - Follow up appointment in 8 weeks (Mon April 14th 930AM)

## 2025-03-17 NOTE — PHYSICAL EXAM
[Well groomed] : well groomed [Cooperative] : cooperative [Euthymic] : euthymic [Full] : full [Soft] : soft [Linear/Goal Directed] : linear/goal directed [None] : none [None Reported] : none reported [Average] : average [WNL] : within normal limits [de-identified] : Arabic speaking [de-identified] : has a negative opinion of medication for psychiatric d/o, seemingly due to stigma

## 2025-03-17 NOTE — DISCUSSION/SUMMARY
[FreeTextEntry1] : Patient is a 57-year-old female, single, originally from [place] but [place] the [place], Romansh-speaking, has two adult children (son in [place] and daughter in [place]), currently domiciled with daughter and family, formerly employed as a nanny, per chart w/ PMHx of diabetes and obstructive sleep apnea not on CPAP/BiPAP, w/ PPHx of 1 [facility]PP admission in February 2024 for suspected suicide attempt (OD on nine Tylenol PM - patient denies it was a SA), medication trial history of Paxil 10 mg for unclear time period prior to IPP admission in 2024, per chart no known substance use history, no family psychiatric history, who presents to [facility]PD for continuing follow-up for medication management.  On continued psychiatric evaluation, patient endorses overall improving mood and improving anxiety in the context of ADLs and stress at work and in the home, there have been no more panic attacks. Denies suicidal, psychotic, manic, delusional, or paranoid ideations. She reported some increased anxiety and headaches on Zoloft, and dry mouth from Paxil, which influenced her decisions to stop taking the medication. Discussed risks and benefits of recurrence of mood and or anxiety symptoms without medication and patient expressed understanding was started on 5mg of Lexapro to target symptoms, to good effect. Plan to continue Lexapro at this time. Plan to also continue longitudinal care at the clinic with psychotherapy at this time. Patient is stable for outpatient level of care.

## 2025-04-10 NOTE — ASSESSMENT
[FreeTextEntry1] : Joint pain: Suspect most likely that pt has degenerative joint disease, also possibly with overlying fibromyalgia. Her elbow symptoms are suggestive of medial and lateral epicondylitis, and she also likely has muscle tension in the neck and thoracic spine. She does not have any specific symptoms or exam findings of inflammatory arthritis or a systemic connective tissue disease. - f/u x-rays of c-spine, t-spine, shoulders, elbows, hands, l-spine, knees, feet - f/u labs for inflammatory arthritis - Start prn cyclobenzaprine 5 mg for now, discussed side effects  f/u in 2 months, will call pt's daughter with lab and x-ray results
(4) excellent

## 2025-04-10 NOTE — HISTORY OF PRESENT ILLNESS
[FreeTextEntry1] : Since around [id number], pt has been experiencing pain worst in her neck/upper back but also radiating down her low back and R leg, with pain and stiffness in the PIPs, medial and lateral elbow pain, knee and shoulder pain, and pain in the feet in the mornings x 15 minutes when she first stands up. She denies rashes, diarrhea or swelling. Pt tried cold compress and ibuprofen with no improvement. + Intermittent tingling in the hands, knees and neck. Pt denies any triggers or exacerbating factors.   Physical exam: GEN: Pleasant, AAO woman sitting on exam table in NAD SKIN: no rashes HEAD: no alopecia PULM: Clear to auscultation b/l CV: Regular rate and rhythm, no murmurs MSK Neck: + Slightly limited L lateral rotation with pain on L rotation Shoulders: Full ROM b/l, + pain with ROM b/l Elbows: Full ROM b/l, no effusions, + TTP in medial and lateral epicondyles Wrists: Full ROM b/l, no effusions Hands: no synovitis Hips; Full ROM b/l Knees; no effusions, + pain with ROM b/l, full ROM b/l Ankles: no effusions, full ROM b/l Feet: no effusions, no TTP EXT: no nail changes

## 2025-04-14 NOTE — HISTORY OF PRESENT ILLNESS
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 [FreeTextEntry2] : Past psychiatric history of 1 [facility]PP admission in February 2024 for suspected suicide attempt, prior suicide history of one suspected suicide attempt, medication trial history of Paxil 10 mg for unclear time period prior to IPP admission.  Patient states she was started on Paxil 10 mg for sleep a few years ago (exact time unknown). States she started Paxil coinciding with also starting to have panic attacks around 3 years ago, which she describes as a feeling of increased anxiety, dry mouth, heart beating fast, difficulty breathing. States her panic attacks occur daily. Denies knowing what the triggers are. States they started when she was preparing to travel to [place]. Reports Paxil had initially helped her anxiety and panic attacks but there were times when she took it daily, felt better, and then stopped it which caused rebound anxiety. Denies any other medication trials.   [FreeTextEntry3] : Paxil 10 mg (few years, did not always take daily), Paxil 20 mg (on 20 mg for about 2 weeks). Zoloft 50 mg (on for about 7 weeks; experienced increased anxiety and headaches)

## 2025-04-14 NOTE — DISCUSSION/SUMMARY
[FreeTextEntry1] : Patient is a 57-year-old female, single, originally from [place] but [place], Divehi-speaking, has two adult children (son in [place] and daughter in [place]), currently domiciled with daughter and family, formerly employed as a nanny, per chart w/ PMHx of diabetes and obstructive sleep apnea not on CPAP/BiPAP, w/ PPHx of 1 IPP admission in February 2024 for suspected suicide attempt (OD on nine Tylenol PM - patient denies it was a SA), medication trial history of Paxil 10 mg for unclear time period prior to IPP admission in 2024, per chart no known substance use history, no family psychiatric history, who presents to [facility] for continuing follow-up for medication management.  On continued psychiatric evaluation, patient endorses overall improving mood and improving anxiety in the context of ADLs and stress at work and in the home, there have been no more panic attacks. Denies suicidal, psychotic, manic, delusional, or paranoid ideations. She reported some increased anxiety and headaches on Zoloft, and dry mouth from Paxil, which influenced her decisions to stop taking the medication. Discussed risks and benefits of recurrence of mood and or anxiety symptoms without medication and patient expressed understanding was started on 5mg of Lexapro to target symptoms, to good effect. Plan to continue Lexapro at this time. Plan to also continue longitudinal care at the clinic with psychotherapy at this time. Patient is stable for outpatient level of care.

## 2025-04-14 NOTE — PLAN
[No] : No [Medication education provided] : Medication education provided. [FreeTextEntry5] : - increase Lexapro to 10mg (from 5mg) PO daily ---Self discontinued Zoloft (and Trazodone) due to reported side effects amongst reported improved in mood and anxiety  ---Was initially discharged on Paxil from [facility] in 2024, but reported side effects so was cross tapered to Zoloft at [facility] - c/w psychotherapy with therapist at the clinic - Follow up appointment in 6 weeks (Mon May 26th 11AM)

## 2025-04-14 NOTE — PHYSICAL EXAM
[Well groomed] : well groomed [Cooperative] : cooperative [Depressed] : depressed [Labile] : labile [Soft] : soft [Linear/Goal Directed] : linear/goal directed [None] : none [None Reported] : none reported [Average] : average [WNL] : within normal limits [de-identified] : Syriac speaking [de-identified] : has a negative opinion of medication for psychiatric d/o, seemingly due to stigma

## 2025-04-23 NOTE — REASON FOR VISIT
[Time Spent: ____ minutes] : Total time spent using  services: [unfilled] minutes. The patient's primary language is not English thus required  services. [Patient] : Patient [Other: ______] : provided by MIKHAIL [FreeTextEntry4] : 11:00 AM [FreeTextEntry5] : 11:53 AM [TWNoteComboBox1] : Luxembourgish [Interpreters_IDNumber] : [id number] [Interpreters_FullName] : Amisha [FreeTextEntry3] : Algerian  [FreeTextEntry1] : Psychotherapy follow-up visit with the treatment diagnoses of  1) (311) (F32.A) Depression 2) (300.01) (F41.0) Panic disorder

## 2025-04-23 NOTE — RISK ASSESSMENT
[Low acute suicide risk] : Low acute suicide risk [No, patient denies ideation or behavior] : No, patient denies ideation or behavior [No] : No [FreeTextEntry9] :  Pt denied a/v/h, denied s/h/i, denied NSSIB, and denied recent use of substances.

## 2025-04-23 NOTE — PLAN
Subjective:       Patient ID: Deborah Sotelo is a 60 y.o. female.    Chief Complaint: Follow-up    60 year old female with PMHx of DM2, HTN and arthritis of the left shoulder comes in with severe pain of the Left shoulder. Patient have seen Dr. Minaya recently, an MRI of shoulder was done which showed severe DJD in the left shoulder. He gave her a steroid shot which worked only for 2-3 days and the pain came back again. Dr. Minaya has prescribed Mobic to the patient but patient has stomach ulcers and cannot take NSAIDs. She complains of the severe pain that prevents her from sleeping. She teared up during the visit and mentioned how the pain is preventing her from being able to do daily tasks.  She is also pale today. She has Hx of Anemia and she reports being more pale than usual.     Review of Systems   Constitutional: Negative for activity change, appetite change, chills, fatigue and fever.   HENT: Negative for nasal congestion, postnasal drip, rhinorrhea, sinus pressure/congestion, sneezing and sore throat.    Respiratory: Negative for cough, choking, chest tightness and shortness of breath.    Cardiovascular: Negative for chest pain, palpitations, leg swelling and claudication.   Gastrointestinal: Negative for abdominal distention, abdominal pain, blood in stool, constipation, diarrhea, nausea and vomiting.   Genitourinary: Negative for bladder incontinence, decreased urine volume, difficulty urinating, dyspareunia, dysuria, flank pain, genital sores, hematuria, nocturia, urgency, vaginal bleeding, vaginal discharge, vaginal pain and vaginal dryness.   Musculoskeletal: Positive for arthralgias.   Integumentary:  Negative for breast mass and breast discharge.   Neurological: Negative for seizures, light-headedness, numbness, headaches, disturbances in coordination and coordination difficulties.   Hematological: Negative for adenopathy.   Psychiatric/Behavioral: Negative for agitation.   All other systems  reviewed and are negative.  Breast: Negative for mass        Objective:      Physical Exam  Vitals and nursing note reviewed.   Constitutional:       General: She is not in acute distress.     Appearance: Normal appearance. She is obese. She is not ill-appearing or toxic-appearing.   HENT:      Head: Normocephalic.      Right Ear: External ear normal.      Left Ear: External ear normal.      Nose: Nose normal. No congestion or rhinorrhea.      Mouth/Throat:      Mouth: Mucous membranes are moist.   Eyes:      General:         Right eye: No discharge.         Left eye: No discharge.      Conjunctiva/sclera: Conjunctivae normal.   Cardiovascular:      Rate and Rhythm: Normal rate and regular rhythm.      Pulses: Normal pulses.      Heart sounds: Normal heart sounds. No murmur heard.  Pulmonary:      Effort: Pulmonary effort is normal. No respiratory distress.      Breath sounds: Normal breath sounds. No rhonchi.   Abdominal:      General: Bowel sounds are normal. There is no distension.      Palpations: Abdomen is soft.      Tenderness: There is no abdominal tenderness.   Musculoskeletal:      Left shoulder: Tenderness present. Decreased range of motion.      Cervical back: Neck supple.   Skin:     General: Skin is warm.      Coloration: Skin is pale.   Neurological:      Mental Status: She is alert and oriented to person, place, and time.         Assessment:       Problem List Items Addressed This Visit        Oncology    Anemia - Primary     CBC and Iron studies pending  Patient has Hx of anemia with MCV of 70s  Will follow up with results and contact patient when results are available.            Relevant Orders    CBC Auto Differential    Iron and TIBC    Ferritin       Endocrine    Type 2 diabetes mellitus with foot ulcer, with long-term current use of insulin     A1c was ordered           Relevant Orders    Hemoglobin A1C       Orthopedic    Shoulder pain     Dr. Minaya office was contacted and got an  appointment for the patient for tomorrow at 2 pm.   Patient has failed injection trial and cannot take NSAIDS due to ulcers.  Voltaren gel was prescribed           Relevant Medications    CMPD diclofenac 3%- cyclobenzaprine 2%- baclofen 2%- gabapentin 6%- LIDOcaine 2%- prilocaine 2% transdermal gel          Plan:       Anemia, unspecified type  -     CBC Auto Differential; Future; Expected date: 07/05/2022  -     Iron and TIBC; Future; Expected date: 07/05/2022  -     Ferritin; Future; Expected date: 07/05/2022    Type 2 diabetes mellitus with foot ulcer, with long-term current use of insulin  -     Hemoglobin A1C; Future; Expected date: 07/05/2022    Shoulder pain, unspecified chronicity, unspecified laterality  -     CMPD diclofenac 3%- cyclobenzaprine 2%- baclofen 2%- gabapentin 6%- LIDOcaine 2%- prilocaine 2% transdermal gel; Apply 1 to 2 grams up to 4 times daily as directed for additional pain relief  Dispense: 240 g; Refill: 1             [Clifton Therapy] : Clifton Therapy  [Motivational Interviewing] : Motivational Interviewing  [Psychoeducation] : Psychoeducation  [Supportive Therapy] : Supportive Therapy [FreeTextEntry2] : Treatment Goal (effective as of 4/21/25, CGI 4/21/25):  Barbara will identify x3 triggers and x3 coping skills to regulate emotions adequately and communicate her thoughts and feelings effectively to others.  Objective A. Pt will use x1-3 coping skills daily to regulate thoughts and emotions.  Objective B. Pt will review mood-altering events in session every 1-4 weeks to process triggers and coping skill applications to gain self-awareness and insights.  Objective C. Pt will maintain medication management as prescribed daily and attend all scheduled appointments (Medication Management: Every 1-3 months & Individual Therapy: Every 1-4 weeks).  Recommended Frequency of Visits: Medication Management: Every 1-3 months Individual Therapy: Every 1-4 weeks  [de-identified] : Barbara is building insights to identify x3 triggers and x3 coping skills that she can use to manage her thoughts and emotions, and to communicate her thoughts and feelings with others. The patient wants to process her emotions and thoughts with the therapist to improve her insights and strengthen her emotional resilience. Her primary stressors are relational conflicts and setting healthy boundaries.   Today, Barbara reported an overall better mood and increased resilience to stress: "I don't let things bother me anymore." We reviewed recent positive experiences to highlight her progress in mood and stress management. She reported regularly using breathing exercises and journaling to manage her thoughts and feelings. We completed the treatment review during today's session.   Barbara showed making progress toward her treatment goal by reporting feeling better overall, PHQ9 score showing a low score, using x1-2 coping skills to reduce bodily tension (breathing exercises, self-talk, etc.), taking medications as prescribed daily, and attending all scheduled appointments (medication management every 1-3 months & individual therapy every 1-3 weeks). Barbara will continue with 4-4-6 tension-release breathing exercises (daily, x3-5), TIPs skills (using cold temperature) when feeling anxious, and use the mantra, "I am not stuck," x3-5 daily to expand her approaches and attitudes toward her work stress. We will continue to discuss her recent symptoms in the next session while exploring organizing skills from today's session until our next session.    Skills Training (all types):  - Tension-release breathing exercises 4-4-6 (x2 daily): Inhale for 4 seconds, hold the air for 4 seconds, exhale for 4 seconds). - TIPPS (Temperature, cold): Putting ice on the back of her neck, holding ice, washing face with cold water - Problem-solving skills: Reviewing the list of Pros vs. Cons - Using the mantra, "I am not stuck," x3-5 daily to expand her approaches and attitudes toward her work stress.  - Problem-solving skills: Considering options by brainstorming - Using self-talk to self-soothe and prioritize self-care - Threat appraisal skills:1) Big danger? 2) Catastrophic possibilities? 3) More than I can cope with (consider past positive outcomes)?   Recommended services & referrals made: - 3/25/24: [facility] [phone], Pt was receptive - Legal services via [employer]: [facility],  [facility], [facility]  Support Network (established contact consent/PHI Release): - Emergency and Collateral Contact: Cyndie Morgan, Daughter, [phone]   Follow-up: - Return in 4 week(s).

## 2025-04-23 NOTE — PLAN
[[facility]] : [facility]  [Motivational Interviewing] : Motivational Interviewing  [Psychoeducation] : Psychoeducation  [Supportive Therapy] : Supportive Therapy [FreeTextEntry2] : Treatment Goal (effective as of 4/21/25, CGI 4/21/25):  Barbara will identify x3 triggers and x3 coping skills to regulate emotions adequately and communicate her thoughts and feelings effectively to others.  Objective A. Pt will use x1-3 coping skills daily to regulate thoughts and emotions.  Objective B. Pt will review mood-altering events in session every 1-4 weeks to process triggers and coping skill applications to gain self-awareness and insights.  Objective C. Pt will maintain medication management as prescribed daily and attend all scheduled appointments (Medication Management: Every 1-3 months & Individual Therapy: Every 1-4 weeks).  Recommended Frequency of Visits: Medication Management: Every 1-3 months Individual Therapy: Every 1-4 weeks  [de-identified] : Barbara is building insights to identify x3 triggers and x3 coping skills that she can use to manage her thoughts and emotions, and to communicate her thoughts and feelings with others. The patient wants to process her emotions and thoughts with the therapist to improve her insights and strengthen her emotional resilience. Her primary stressors are relational conflicts and setting healthy boundaries.   Today, Barbara reported an overall better mood and increased resilience to stress: "I don't let things bother me anymore." We reviewed recent positive experiences to highlight her progress in mood and stress management. She reported regularly using breathing exercises and journaling to manage her thoughts and feelings. We completed the treatment review during today's session.   Barbara showed making progress toward her treatment goal by reporting feeling better overall, PHQ9 score showing a low score, using x1-2 coping skills to reduce bodily tension (breathing exercises, self-talk, etc.), taking medications as prescribed daily, and attending all scheduled appointments (medication management every 1-3 months & individual therapy every 1-3 weeks). Barbara will continue with 4-4-6 tension-release breathing exercises (daily, x3-5), TIPs skills (using cold temperature) when feeling anxious, and use the mantra, "I am not stuck," x3-5 daily to expand her approaches and attitudes toward her work stress. We will continue to discuss her recent symptoms in the next session while exploring organizing skills from today's session until our next session.    Skills Training (all types):  - Tension-release breathing exercises 4-4-6 (x2 daily): Inhale for 4 seconds, hold the air for 4 seconds, exhale for 4 seconds). - TIPPS (Temperature, cold): Putting ice on the back of her neck, holding ice, washing face with cold water - Problem-solving skills: Reviewing the list of Pros vs. Cons - Using the mantra, "I am not stuck," x3-5 daily to expand her approaches and attitudes toward her work stress.  - Problem-solving skills: Considering options by brainstorming - Using self-talk to self-soothe and prioritize self-care - Threat appraisal skills:1) Big danger? 2) Catastrophic possibilities? 3) More than I can cope with (consider past positive outcomes)?   Recommended services & referrals made: - 3/25/24: [facility] [phone], Pt was receptive - Legal services via CBO: [facility],  [facility], [facility]  Support Network (established contact consent/PHI Release): - Emergency and Collateral Contact: Cyndie Morgan, Daughter, [phone]   Follow-up: - Return in 4 week(s).

## 2025-04-23 NOTE — ADDENDUM
[FreeTextEntry1] : - The patient prefers attending hybrid sessions.  - The treatment plan was delayed due to conflicts in schedule.  The patient's preference for treatment visit types: [ ] In-person  [ ] Telehealth [x ] Hybrid  PRIMARY CARE Dr. Ridley, Dr. Ag The last visit: 11/2024 The patient confirmed having regular visits with PCP and denied concern.   MEDICAL CONDITION(S) -Diabetes -Arthritis   SUBSTANCE USE - The patient denied any concerns or problem with substance use. - The patient denied substance-related hx of hospitalization, detox/rehab, or treatment.   SMOKING CESSATION Do you Smoke? [ ] Yes [x] No Do you want to quit? [ ] Yes [ ] No [x] N/A

## 2025-04-23 NOTE — DISCUSSION/SUMMARY
[Initial Plan] : Initial Plan [Able to manage surrounding demands and opportunities] : able to manage surrounding demands and opportunities [Able to set and pursue goals] : able to set and pursue goals [Has vocational interests or hobbies] : has vocational interests or hobbies [Housing stability] : housing stability [Mental Health] : Mental Health [Initial] : Initial [every ___ months] : every [unfilled] months [every ___ weeks] : every [unfilled] weeks [Improvement in symptoms as evidenced by:] : Improvement in symptoms as evidenced by: [A change in level of care is needed as evidenced by:] : A change in level of care is needed as evidenced by: [Other rationale for transition/discharge:] : Other rationale for transition/discharge: [None - Reason others did not participate:] : None - Reason others did not participate:  [Yes] : Yes [Psychiatric Provider/Prescriber] : Psychiatric Provider/Prescriber [Therapist] : Therapist [Supervisor (if needed)] : Supervisor [Plan Review] : Plan Review [Good impulse control] : good impulse control [Motivated and ready for change] : motivated and ready for change [Motivated to maintain or improve physical health] : motivated to maintain or improve physical health [Part of a supportive family] : part of a supportive family [Steady employment] : steady employment [Continued - Progress made] : Continued - Progress made: [FreeTextEntry1] : 4/21/2025 [FreeTextEntry2] : 4/21/2026 [FreeTextEntry3] : 2/21/2024 [FreeTextEntry8] : Limited English, Using  services  [de-identified] : Barbara reported an overall better mood and increased resilience to stress: "I don't let things bother me anymore." We reviewed recent positive experiences to highlight her progress in mood and stress management. She reported regularly using breathing exercises and journaling to manage her thoughts and feelings. [de-identified] : Pt will review mood-altering events in session every 1-4 weeks to process triggers and coping skill applications to gain self-awareness and insights. [de-identified] : 4/21/2026 [de-identified] : Barbara reported an overall better mood and increased resilience to stress: "I don't let things bother me anymore." We reviewed recent positive experiences to highlight her progress in mood and stress management. She reported regularly using breathing exercises and journaling to manage her thoughts and feelings. [FreeTextEntry4] : (Continued Problem/Need I) [de-identified] : Pt will maintain medication management as prescribed daily and attend scheduled appointments.  [de-identified] : 4/21/2026 [de-identified] : Barbara reported an overall better mood and increased resilience to stress: "I don't let things bother me anymore." We reviewed recent positive experiences to highlight her progress in mood and stress management. She reported regularly using breathing exercises and journaling to manage her thoughts and feelings. [FreeTextEntry5] : Acceptance and Commitment Therapy, [place] [facility], Motivational Interviewing, Psychoeducation, and Supportive Therapy [de-identified] : The patient expresses improvement in performing activities of daily living and/or says progress with initial complaint symptoms. In addition, the treatment team will conduct diagnosis-based screenings as needed.  [de-identified] : If the patient is unable to perform activities of daily living and/or expresses suicidal ideation, a higher level of care will be considered. [de-identified] : Other rationales for transition/discharge are granted/applied upon the patient's not communicating with the providers, relocation, self-termination, and/or requests for treatment termination/transfer to another facility. [de-identified] : Pt declined. [de-identified] : Marvin Giang ("Amita"), Bhumika Kinsey

## 2025-04-23 NOTE — REASON FOR VISIT
[Time Spent: ____ minutes] : Total time spent using  services: [unfilled] minutes. The patient's primary language is not English thus required  services. [Patient] : Patient [Other: ______] : provided by MIKHAIL [FreeTextEntry4] : 11:00 AM [FreeTextEntry5] : 11:53 AM [TWNoteComboBox1] : Malay [Interpreters_IDNumber] : [id number] [Interpreters_FullName] : Amisha [FreeTextEntry3] : Ug[place]an  [FreeTextEntry1] : Psychotherapy follow-up visit with the treatment diagnoses of  1) (311) (F32.A) Depression 2) (300.01) (F41.0) Panic disorder

## 2025-04-23 NOTE — PLAN
[[place] [facility]] : [place] [facility]  [Motivational Interviewing] : Motivational Interviewing  [Psychoeducation] : Psychoeducation  [Supportive Therapy] : Supportive Therapy [FreeTextEntry2] : Treatment Goal (effective as of 4/21/25, CGI 4/21/25):  Barbara will identify x3 triggers and x3 coping skills to regulate emotions adequately and communicate her thoughts and feelings effectively to others.  Objective A. Pt will use x1-3 coping skills daily to regulate thoughts and emotions.  Objective B. Pt will review mood-altering events in session every 1-4 weeks to process triggers and coping skill applications to gain self-awareness and insights.  Objective C. Pt will maintain medication management as prescribed daily and attend all scheduled appointments (Medication Management: Every 1-3 months & Individual Therapy: Every 1-4 weeks).  Recommended Frequency of Visits: Medication Management: Every 1-3 months Individual Therapy: Every 1-4 weeks  [de-identified] : Barbara is building insights to identify x3 triggers and x3 coping skills that she can use to manage her thoughts and emotions, and to communicate her thoughts and feelings with others. The patient wants to process her emotions and thoughts with the therapist to improve her insights and strengthen her emotional resilience. Her primary stressors are relational conflicts and setting healthy boundaries.   Today, Barbara reported an overall better mood and increased resilience to stress: "I don't let things bother me anymore." We reviewed recent positive experiences to highlight her progress in mood and stress management. She reported regularly using breathing exercises and journaling to manage her thoughts and feelings. We completed the treatment review during today's session.   Barbara showed making progress toward her treatment goal by reporting feeling better overall, PHQ9 score showing a low score, using x1-2 coping skills to reduce bodily tension (breathing exercises, self-talk, etc.), taking medications as prescribed daily, and attending all scheduled appointments (medication management every 1-3 months & individual therapy every 1-3 weeks). Barbara will continue with 4-4-6 tension-release breathing exercises (daily, x3-5), TIPs skills (using cold temperature) when feeling anxious, and use the mantra, "I am not stuck," x3-5 daily to expand her approaches and attitudes toward her work stress. We will continue to discuss her recent symptoms in the next session while exploring organizing skills from today's session until our next session.    Skills Training (all types):  - Tension-release breathing exercises 4-4-6 (x2 daily): Inhale for 4 seconds, hold the air for 4 seconds, exhale for 4 seconds). - TIPPS (Temperature, cold): Putting ice on the back of her neck, holding ice, washing face with cold water - Problem-solving skills: Reviewing the list of Pros vs. Cons - Using the mantra, "I am not stuck," x3-5 daily to expand her approaches and attitudes toward her work stress.  - Problem-solving skills: Considering options by brainstorming - Using self-talk to self-soothe and prioritize self-care - Threat appraisal skills:1) Big danger? 2) Catastrophic possibilities? 3) More than I can cope with (consider past positive outcomes)?   Recommended services & referrals made: - 3/25/24: [facility] [phone], Pt was receptive - Legal services via C[facility]: [facility],  [facility], [facility]  Support Network (established contact consent/PHI Release): - Emergency and Collateral Contact: Cyndie Morgan, Daughter, [phone]   Follow-up: - Return in 4 week(s).

## 2025-04-23 NOTE — PHYSICAL EXAM
[4 - Moderately ill] : 4 - Moderately ill  (overt symptoms causing noticeable, but modest, functional impairment or distress; symptom level may warrant medication) [2 - Much improved] : 2 - Much improved  (notably better with significant reduction of symptoms; increase in the level of functioning but some symptoms remain)

## 2025-04-23 NOTE — DISCUSSION/SUMMARY
[Initial Plan] : Initial Plan [Able to manage surrounding demands and opportunities] : able to manage surrounding demands and opportunities [Able to set and pursue goals] : able to set and pursue goals [Has vocational interests or hobbies] : has vocational interests or hobbies [Housing stability] : housing stability [Mental Health] : Mental Health [Initial] : Initial [every ___ months] : every [unfilled] months [every ___ weeks] : every [unfilled] weeks [Improvement in symptoms as evidenced by:] : Improvement in symptoms as evidenced by: [A change in level of care is needed as evidenced by:] : A change in level of care is needed as evidenced by: [Other rationale for transition/discharge:] : Other rationale for transition/discharge: [None - Reason others did not participate:] : None - Reason others did not participate:  [Yes] : Yes [Psychiatric Provider/Prescriber] : Psychiatric Provider/Prescriber [Therapist] : Therapist [Supervisor (if needed)] : Supervisor [Plan Review] : Plan Review [Good impulse control] : good impulse control [Motivated and ready for change] : motivated and ready for change [Motivated to maintain or improve physical health] : motivated to maintain or improve physical health [Part of a supportive family] : part of a supportive family [Steady employment] : steady employment [Continued - Progress made] : Continued - Progress made: [FreeTextEntry1] : 4/21/2025 [FreeTextEntry2] : 4/21/2026 [FreeTextEntry3] : 2/21/2024 [FreeTextEntry8] : Limited English, Using  services  [de-identified] : Barbara reported an overall better mood and increased resilience to stress: "I don't let things bother me anymore." We reviewed recent positive experiences to highlight her progress in mood and stress management. She reported regularly using breathing exercises and journaling to manage her thoughts and feelings. [de-identified] : Pt will review mood-altering events in session every 1-4 weeks to process triggers and coping skill applications to gain self-awareness and insights. [de-identified] : 4/21/2026 [de-identified] : Barbara reported an overall better mood and increased resilience to stress: "I don't let things bother me anymore." We reviewed recent positive experiences to highlight her progress in mood and stress management. She reported regularly using breathing exercises and journaling to manage her thoughts and feelings. [FreeTextEntry4] : (Continued Problem/Need I) [de-identified] : Pt will maintain medication management as prescribed daily and attend scheduled appointments.  [de-identified] : 4/21/2026 [de-identified] : Barbara reported an overall better mood and increased resilience to stress: "I don't let things bother me anymore." We reviewed recent positive experiences to highlight her progress in mood and stress management. She reported regularly using breathing exercises and journaling to manage her thoughts and feelings. [FreeTextEntry5] : Acceptance and Commitment Therapy, [place] [facility], Motivational Interviewing, Psychoeducation, and Supportive Therapy [de-identified] : The patient expresses improvement in performing activities of daily living and/or says progress with initial complaint symptoms. In addition, the treatment team will conduct diagnosis-based screenings as needed.  [de-identified] : If the patient is unable to perform activities of daily living and/or expresses suicidal ideation, a higher level of care will be considered. [de-identified] : Other rationales for transition/discharge are granted/applied upon the patient's not communicating with the providers, relocation, self-termination, and/or requests for treatment termination/transfer to another facility. [de-identified] : Pt declined. [de-identified] : Marvin Giang ("Amita"), Bhumika Kinsey

## 2025-04-23 NOTE — REASON FOR VISIT
[Time Spent: ____ minutes] : Total time spent using  services: [unfilled] minutes. The patient's primary language is not English thus required  services. [Patient] : Patient [Other: ______] : provided by MIKHAIL [FreeTextEntry4] : 11:00 AM [FreeTextEntry5] : 11:53 AM [TWNoteComboBox1] : Kiswahili [Interpreters_IDNumber] : [id number] [Interpreters_FullName] : Amisha [FreeTextEntry3] : [place]  [FreeTextEntry1] : Psychotherapy follow-up visit with the treatment diagnoses of  1) (311) (F32.A) Depression 2) (300.01) (F41.0) Panic disorder

## 2025-04-23 NOTE — DISCUSSION/SUMMARY
[Initial Plan] : Initial Plan [Able to manage surrounding demands and opportunities] : able to manage surrounding demands and opportunities [Able to set and pursue goals] : able to set and pursue goals [Has vocational interests or hobbies] : has vocational interests or hobbies [Housing stability] : housing stability [Mental Health] : Mental Health [Initial] : Initial [every ___ months] : every [unfilled] months [every ___ weeks] : every [unfilled] weeks [Improvement in symptoms as evidenced by:] : Improvement in symptoms as evidenced by: [A change in level of care is needed as evidenced by:] : A change in level of care is needed as evidenced by: [Other rationale for transition/discharge:] : Other rationale for transition/discharge: [None - Reason others did not participate:] : None - Reason others did not participate:  [Yes] : Yes [Psychiatric Provider/Prescriber] : Psychiatric Provider/Prescriber [Therapist] : Therapist [Supervisor (if needed)] : Supervisor [Plan Review] : Plan Review [Good impulse control] : good impulse control [Motivated and ready for change] : motivated and ready for change [Motivated to maintain or improve physical health] : motivated to maintain or improve physical health [Part of a supportive family] : part of a supportive family [Steady employment] : steady employment [Continued - Progress made] : Continued - Progress made: [FreeTextEntry1] : 4/21/2025 [FreeTextEntry2] : 4/21/2026 [FreeTextEntry3] : 2/21/2024 [FreeTextEntry8] : Limited English, Using  services  [de-identified] : Barbara reported an overall better mood and increased resilience to stress: "I don't let things bother me anymore." We reviewed recent positive experiences to highlight her progress in mood and stress management. She reported regularly using breathing exercises and journaling to manage her thoughts and feelings. [de-identified] : Pt will review mood-altering events in session every 1-4 weeks to process triggers and coping skill applications to gain self-awareness and insights. [de-identified] : 4/21/2026 [de-identified] : Barbara reported an overall better mood and increased resilience to stress: "I don't let things bother me anymore." We reviewed recent positive experiences to highlight her progress in mood and stress management. She reported regularly using breathing exercises and journaling to manage her thoughts and feelings. [FreeTextEntry4] : (Continued Problem/Need I) [de-identified] : Pt will maintain medication management as prescribed daily and attend scheduled appointments.  [de-identified] : 4/21/2026 [de-identified] : Barbara reported an overall better mood and increased resilience to stress: "I don't let things bother me anymore." We reviewed recent positive experiences to highlight her progress in mood and stress management. She reported regularly using breathing exercises and journaling to manage her thoughts and feelings. [FreeTextEntry5] : Acceptance and Commitment Therapy, [place] Therapy, Motivational Interviewing, Psychoeducation, and Supportive Therapy [de-identified] : The patient expresses improvement in performing activities of daily living and/or says progress with initial complaint symptoms. In addition, the treatment team will conduct diagnosis-based screenings as needed.  [de-identified] : If the patient is unable to perform activities of daily living and/or expresses suicidal ideation, a higher level of care will be considered. [de-identified] : Other rationales for transition/discharge are granted/applied upon the patient's not communicating with the providers, relocation, self-termination, and/or requests for treatment termination/transfer to another facility. [de-identified] : Pt declined. [de-identified] : Marvin Giang ("Amita"), Bhumika Kinsey

## 2025-04-23 NOTE — PLAN
[[facility]] : [facility]  [Motivational Interviewing] : Motivational Interviewing  [Psychoeducation] : Psychoeducation  [Supportive Therapy] : Supportive Therapy [FreeTextEntry2] : Treatment Goal (effective as of 4/21/25, CGI 4/21/25):  Barbara will identify x3 triggers and x3 coping skills to regulate emotions adequately and communicate her thoughts and feelings effectively to others.  Objective A. Pt will use x1-3 coping skills daily to regulate thoughts and emotions.  Objective B. Pt will review mood-altering events in session every 1-4 weeks to process triggers and coping skill applications to gain self-awareness and insights.  Objective C. Pt will maintain medication management as prescribed daily and attend all scheduled appointments (Medication Management: Every 1-3 months & Individual Therapy: Every 1-4 weeks).  Recommended Frequency of Visits: Medication Management: Every 1-3 months Individual Therapy: Every 1-4 weeks  [de-identified] : Barbara is building insights to identify x3 triggers and x3 coping skills that she can use to manage her thoughts and emotions, and to communicate her thoughts and feelings with others. The patient wants to process her emotions and thoughts with the therapist to improve her insights and strengthen her emotional resilience. Her primary stressors are relational conflicts and setting healthy boundaries.   Today, Barbara reported an overall better mood and increased resilience to stress: "I don't let things bother me anymore." We reviewed recent positive experiences to highlight her progress in mood and stress management. She reported regularly using breathing exercises and journaling to manage her thoughts and feelings. We completed the treatment review during today's session.   Barbara showed making progress toward her treatment goal by reporting feeling better overall, PHQ9 score showing a low score, using x1-2 coping skills to reduce bodily tension (breathing exercises, self-talk, etc.), taking medications as prescribed daily, and attending all scheduled appointments (medication management every 1-3 months & individual therapy every 1-3 weeks). Barbara will continue with 4-4-6 tension-release breathing exercises (daily, x3-5), TIPs skills (using cold temperature) when feeling anxious, and use the mantra, "I am not stuck," x3-5 daily to expand her approaches and attitudes toward her work stress. We will continue to discuss her recent symptoms in the next session while exploring organizing skills from today's session until our next session.    Skills Training (all types):  - Tension-release breathing exercises 4-4-6 (x2 daily): Inhale for 4 seconds, hold the air for 4 seconds, exhale for 4 seconds). - TIPPS (Temperature, cold): Putting ice on the back of her neck, holding ice, washing face with cold water - Problem-solving skills: Reviewing the list of Pros vs. Cons - Using the mantra, "I am not stuck," x3-5 daily to expand her approaches and attitudes toward her work stress.  - Problem-solving skills: Considering options by brainstorming - Using self-talk to self-soothe and prioritize self-care - Threat appraisal skills:1) Big danger? 2) Catastrophic possibilities? 3) More than I can cope with (consider past positive outcomes)?   Recommended services & referrals made: - 3/25/24: [facility] [phone], Pt was receptive - Legal services via CBO: [employer],  [facility], [facility]  Support Network (established contact consent/PHI Release): - Emergency and Collateral Contact: Cyndie Morgan, Daughter, [phone]   Follow-up: - Return in 4 week(s).

## 2025-04-23 NOTE — DISCUSSION/SUMMARY
[Initial Plan] : Initial Plan [Able to manage surrounding demands and opportunities] : able to manage surrounding demands and opportunities [Able to set and pursue goals] : able to set and pursue goals [Has vocational interests or hobbies] : has vocational interests or hobbies [Housing stability] : housing stability [Mental Health] : Mental Health [Initial] : Initial [every ___ months] : every [unfilled] months [every ___ weeks] : every [unfilled] weeks [Improvement in symptoms as evidenced by:] : Improvement in symptoms as evidenced by: [A change in level of care is needed as evidenced by:] : A change in level of care is needed as evidenced by: [Other rationale for transition/discharge:] : Other rationale for transition/discharge: [None - Reason others did not participate:] : None - Reason others did not participate:  [Yes] : Yes [Psychiatric Provider/Prescriber] : Psychiatric Provider/Prescriber [Therapist] : Therapist [Supervisor (if needed)] : Supervisor [Plan Review] : Plan Review [Good impulse control] : good impulse control [Motivated and ready for change] : motivated and ready for change [Motivated to maintain or improve physical health] : motivated to maintain or improve physical health [Part of a supportive family] : part of a supportive family [Steady employment] : steady employment [Continued - Progress made] : Continued - Progress made: [FreeTextEntry1] : 4/21/2025 [FreeTextEntry2] : 4/21/2026 [FreeTextEntry3] : 2/21/2024 [FreeTextEntry8] : Limited English, Using  services  [de-identified] : Barbara reported an overall better mood and increased resilience to stress: "I don't let things bother me anymore." We reviewed recent positive experiences to highlight her progress in mood and stress management. She reported regularly using breathing exercises and journaling to manage her thoughts and feelings. [de-identified] : Pt will review mood-altering events in session every 1-4 weeks to process triggers and coping skill applications to gain self-awareness and insights. [de-identified] : 4/21/2026 [de-identified] : Barbara reported an overall better mood and increased resilience to stress: "I don't let things bother me anymore." We reviewed recent positive experiences to highlight her progress in mood and stress management. She reported regularly using breathing exercises and journaling to manage her thoughts and feelings. [FreeTextEntry4] : (Continued Problem/Need I) [de-identified] : Pt will maintain medication management as prescribed daily and attend scheduled appointments.  [de-identified] : 4/21/2026 [de-identified] : Barbara reported an overall better mood and increased resilience to stress: "I don't let things bother me anymore." We reviewed recent positive experiences to highlight her progress in mood and stress management. She reported regularly using breathing exercises and journaling to manage her thoughts and feelings. [FreeTextEntry5] : Acceptance and Commitment Therapy, [place] [facility], Motivational Interviewing, Psychoeducation, and Supportive Therapy [de-identified] : The patient expresses improvement in performing activities of daily living and/or says progress with initial complaint symptoms. In addition, the treatment team will conduct diagnosis-based screenings as needed.  [de-identified] : If the patient is unable to perform activities of daily living and/or expresses suicidal ideation, a higher level of care will be considered. [de-identified] : Other rationales for transition/discharge are granted/applied upon the patient's not communicating with the providers, relocation, self-termination, and/or requests for treatment termination/transfer to another facility. [de-identified] : Pt declined. [de-identified] : Marvin Giang ("Amita"), Bhumika Kinsey

## 2025-04-23 NOTE — REASON FOR VISIT
[Time Spent: ____ minutes] : Total time spent using  services: [unfilled] minutes. The patient's primary language is not English thus required  services. [Patient] : Patient [Other: ______] : provided by MIKHAIL [FreeTextEntry4] : 11:00 AM [FreeTextEntry5] : 11:53 AM [TWNoteComboBox1] : German [Interpreters_IDNumber] : [id number] [Interpreters_FullName] : Amisha [FreeTextEntry3] : Surinamese  [FreeTextEntry1] : Psychotherapy follow-up visit with the treatment diagnoses of  1) (311) (F32.A) Depression 2) (300.01) (F41.0) Panic disorder

## 2025-05-22 NOTE — PHYSICAL EXAM
[Well groomed] : well groomed [Cooperative] : cooperative [Depressed] : depressed [Labile] : labile [Soft] : soft [Linear/Goal Directed] : linear/goal directed [None] : none [None Reported] : none reported [Average] : average [WNL] : within normal limits [de-identified] : less than prior visits [de-identified] : Upper sorbian speaking [de-identified] : has a negative opinion of medication for psychiatric d/o, seemingly due to stigma

## 2025-05-22 NOTE — HISTORY OF PRESENT ILLNESS
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 [FreeTextEntry2] : Past psychiatric history of 1 [facility] admission in February 2024 for suspected suicide attempt, prior suicide history of one suspected suicide attempt, medication trial history of Paxil 10 mg for unclear time period prior to IPP admission.  Patient states she was started on Paxil 10 mg for sleep a few years ago (exact time unknown). States she started Paxil coinciding with also starting to have panic attacks around 3 years ago, which she describes as a feeling of increased anxiety, dry mouth, heart beating fast, difficulty breathing. States her panic attacks occur daily. Denies knowing what the triggers are. States they started when she was preparing to travel to [place]. Reports Paxil had initially helped her anxiety and panic attacks but there were times when she took it daily, felt better, and then stopped it which caused rebound anxiety. Denies any other medication trials.   [FreeTextEntry3] : Paxil 10 mg (few years, did not always take daily), Paxil 20 mg (on 20 mg for about 2 weeks). Zoloft 50 mg (on for about 7 weeks; experienced increased anxiety and headaches)

## 2025-05-22 NOTE — REASON FOR VISIT
[Patient] : Patient [FreeTextEntry1] : Medication management; "On Mothers Day I had an accident at home"

## 2025-05-22 NOTE — PLAN
[No] : No [Medication education provided] : Medication education provided. [FreeTextEntry5] : - c/w Lexapro to 10mg PO daily ---Self discontinued Zoloft (and Trazodone) due to reported side effects amongst reported improved in mood and anxiety  ---Was initially discharged on Paxil from [facility] in 2024, but reported side effects so was cross tapered to Zoloft at [facility] - c/w psychotherapy with therapist at the clinic - Follow up appointment Wed July 30th 930AM

## 2025-05-22 NOTE — DISCUSSION/SUMMARY
[FreeTextEntry1] : Patient is a 57-year-old female, single, originally from [place] but living in the [place] since 2004, Frisian-speaking, has two adult children (son in [place] and daughter in [place]), currently domiciled with daughter and family, formerly employed as a nanny, per chart w/ PMHx of diabetes and obstructive sleep apnea not on CPAP/BiPAP, w/ PPHx of 1 [facility] admission in February 2024 for suspected suicide attempt (OD on nine Tylenol PM - patient denies it was a SA), medication trial history of Paxil 10 mg for unclear time period prior to IPP admission in 2024, per chart no known substance use history, no family psychiatric history, who presents to [facility] for continuing follow-up for medication management.  On continued psychiatric evaluation, patient endorses overall improving mood and improving anxiety in the context of ADLs and stress at work and in the home, there have been no more panic attacks. Denies suicidal, psychotic, manic, delusional, or paranoid ideations. She reported some increased anxiety and headaches on Zoloft, and dry mouth from Paxil, which influenced her decisions to stop taking the medication. Discussed risks and benefits of recurrence of mood and or anxiety symptoms without medication and patient expressed understanding was started on Lexapro to target symptoms, to good effect. Plan to continue Lexapro 10mg at this time. Plan to also continue longitudinal care at the clinic with psychotherapy at this time. Patient is stable for outpatient level of care.

## 2025-06-02 NOTE — PLAN
[facility] : [facility]  [Motivational Interviewing] : Motivational Interviewing  [Psychoeducation] : Psychoeducation  [Supportive Therapy] : Supportive Therapy [FreeTextEntry2] : Treatment Goal (effective as of 4/21/25, CGI 4/21/25):  Barbara will identify x3 triggers and x3 coping skills to regulate emotions adequately and communicate her thoughts and feelings effectively to others.  Objective A. Pt will use x1-3 coping skills daily to regulate thoughts and emotions.  Objective B. Pt will review mood-altering events in session every 1-4 weeks to process triggers and coping skill applications to gain self-awareness and insights.  Objective C. Pt will maintain medication management as prescribed daily and attend all scheduled appointments (Medication Management: Every 1-3 months & Individual Therapy: Every 1-4 weeks).  Recommended Frequency of Visits: Medication Management: Every 1-3 months Individual Therapy: Every 1-4 weeks  [de-identified] : Barbara is building insights to identify x3 triggers and x3 coping skills that she can use to manage her thoughts and emotions, and to communicate her thoughts and feelings with others. The patient wants to process her emotions and thoughts with the therapist to improve her insights and strengthen her emotional resilience. Her primary stressors are relational conflicts and setting healthy boundaries.   Barbara reported an overall better mood today despite recent foot surgery (currently recovering) and dealing with pain and limited mobility due to the injury. She reported slight anxiety about her foot and its recovery, but it minimally impacts her daily functioning. 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 Barbara will inform her boss about her upcoming medical and mental health appointments to ensure adequate continuing care for her injury. She will use a notebook to prepare questions and record the answers to understand her medical needs better and share them during our next session, using teach-back skills to process the information. We discussed her recent incidents and created a list of tasks that require her sole attention (avoiding multitasking) to prevent safety concerns, especially when cooking, running water, carrying heavy objects, and navigating obstacles around the house. We also discussed mindfulness skills to slow down her actions and thought processes to increase her attention and focus. Barbara will use her five senses to stay focused on her tasks three times daily. She will also organize her appointments and essential tasks using visual tools such as paper calendars, Post-it Mini Notes, and note-taking. We will discuss the outcomes in our next session.   Barbara showed making progress toward her treatment goal by reporting feeling better, using x1-2 coping skills to reduce bodily tension (breathing exercises, self-talk, etc.), taking medications as prescribed daily, and attending most of her scheduled appointments (medication management every 1-3 months & individual therapy every 1-3 weeks).    Skills Training (all types):  - Tension-release breathing exercises 4-4-6 (x2 daily): Inhale for 4 seconds, hold the air for 4 seconds, exhale for 4 seconds). - TIPPS (Temperature, cold): Putting ice on the back of her neck, holding ice, washing face with cold water - Problem-solving skills: Reviewing the list of Pros vs. Cons - Using the mantra, "I am not stuck," x3-5 daily to expand her approaches and attitudes toward her work stress.  - Problem-solving skills: Considering options by brainstorming - Using self-talk to self-soothe and prioritize self-care - Threat appraisal skills:1) Big danger? 2) Catastrophic possibilities? 3) More than I can cope with (consider past positive outcomes)?   Recommended services & referrals made: - 3/25/24: [facility] [phone], Pt was receptive - Legal services via CBO: [employer],  [facility], [facility]  Support Network (established contact consent/PHI Release): - Emergency and Collateral Contact: Cyndie Morgan, Daughter, [phone]   Follow-up: - Return in 4 week(s).

## 2025-06-02 NOTE — REASON FOR VISIT
[Time Spent: ____ minutes] : Total time spent using  services: [unfilled] minutes. The patient's primary language is not English thus required  services. [Patient] : Patient [Other: ______] : provided by MIKHAIL [FreeTextEntry4] : 11:00 AM [FreeTextEntry5] : 11:40 AM [TWNoteComboBox1] : Greenlandic [Interpreters_IDNumber] : [id number] [Interpreters_FullName] : Amisha [FreeTextEntry3] : Comoran  [FreeTextEntry1] : Psychotherapy follow-up visit with the treatment diagnoses of  1) (311) (F32.A) Depression 2) (300.01) (F41.0) Panic disorder

## 2025-06-03 NOTE — HISTORY OF PRESENT ILLNESS
[FreeTextEntry1] : L foot Trauma, laceration, Fracture - injury to lesser toes L foot a few days ago  - On  Po abx  - Dressing dry, clean and intact - WBAT in a Darco shoe - Moderate pain  - DM controlled with meds - unsure about A1c

## 2025-06-03 NOTE — ASSESSMENT
[FreeTextEntry1] :  Cont abx Cont wound care - patient was shown how to do dressing changes. Supplies were dispensed  WBAT with a Darco shoe  Cont pain meds (tylenol or Ibuprofen) RTO 3 weeks  or sooner if any problems arise with new Xrays  RX a1C  f/U  Endo

## 2025-06-03 NOTE — PHYSICAL EXAM
[2+] : left foot dorsalis pedis 2+ [de-identified] : pain on palpation toes 2-3 L foot Guarded ROM lesser toes L foot, improved  improved swelling  [FreeTextEntry1] : Laceration of nail bed toes 2 and 3 with Vycril suture intact, no drainage, No erythema, mild  swelling, No suture dehiscence

## 2025-06-18 NOTE — ASSESSMENT
[FreeTextEntry1] : OA: Suspect most likely that pt has degenerative joint disease, also possibly with overlying fibromyalgia. Her elbow symptoms are suggestive of medial and lateral epicondylitis, and she also likely has muscle tension in the neck and thoracic spine. She does not have any specific symptoms or exam findings of inflammatory arthritis or a systemic connective tissue disease. s/p x-rays in 4/2025 demonstrating mild-moderate OA in the feet, mild OA in the hands, mild OA in the c-spine, moderate-severe degenerative changes at L5-S1, mild OA in the elbows, mild AC OA. Pt's labs did not demosntrate e/o inflammatory arthritis - Increase gabapentin to 600 mg qhs - Also advised pt to try wearing compression gloves - Pt exercises with stationary bicycle and treadmill  f/u in 3 months

## 2025-06-18 NOTE — HISTORY OF PRESENT ILLNESS
[FreeTextEntry1] : Pt notes significant improvement in her joint pain on gabapentin 300 mg qhs, which was started in April 2025. She is able to sleep better now. However, she does continue to have pain in her upper back, and she also has a lot of discomfort in her b/l 1st MCPs.  Previous HPI: Since around [id number], pt has been experiencing pain worst in her neck/upper back but also radiating down her low back and R leg, with pain and stiffness in the PIPs, medial and lateral elbow pain, knee and shoulder pain, and pain in the feet in the mornings x 15 minutes when she first stands up. She denies rashes, diarrhea or swelling. Pt tried cold compress and ibuprofen with no improvement. + Intermittent tingling in the hands, knees and neck. Pt denies any triggers or exacerbating factors.   Physical exam: GEN: Pleasant, AAO woman sitting on exam table in NAD SKIN: no rashes HEAD: no alopecia PULM: Clear to auscultation b/l CV: Regular rate and rhythm, no murmurs MSK Neck: + Slightly limited L lateral rotation with pain on L rotation Shoulders: Full ROM b/l, + pain with ROM b/l Elbows: Full ROM b/l, no effusions, +TTP in medial and lateral epicondyles Wrists: Full ROM b/l, no effusions Hands: no synovitis, + TTP in 1st MCPs b/l Hips; Full ROM b/l Knees; no effusionsfull ROM b/l Ankles: no effusions, full ROM b/l Feet: no effusions, no TTP EXT: no nail changes

## 2025-06-26 NOTE — PHYSICAL EXAM
[Alert] : alert [Well Nourished] : well nourished [Healthy Appearance] : healthy appearance [No Acute Distress] : no acute distress [Normal Sclera/Conjunctiva] : normal sclera/conjunctiva [Normal Outer Ear/Nose] : the ears and nose were normal in appearance [Normal Hearing] : hearing was normal [No Neck Mass] : no neck mass was observed [Thyroid Not Enlarged] : the thyroid was not enlarged [No Respiratory Distress] : no respiratory distress [Normal Rate and Effort] : normal respiratory rate and effort [Normal Rate] : heart rate was normal [Not Tender] : non-tender [Soft] : abdomen soft

## 2025-06-27 NOTE — HISTORY OF PRESENT ILLNESS
[FreeTextEntry1] : Patient presents to the Endo clinic with no active complaints. She follows with her podiatrist regularly and her most recent a1c 6.2%.

## 2025-06-27 NOTE — ASSESSMENT
[Diabetes Foot Care] : diabetes foot care [Long Term Vascular Complications] : long term vascular complications of diabetes [FreeTextEntry1] : big improvement in diabetes control A1C 6.2,  c/w ozempic, metformin, and pioglitazone.

## 2025-07-01 NOTE — PLAN
[facility]] : [facility]  [Motivational Interviewing] : Motivational Interviewing  [Psychoeducation] : Psychoeducation  [Supportive Therapy] : Supportive Therapy [FreeTextEntry2] : Treatment Goal (effective as of 4/21/25, CGI 4/21/25):  Barbara will identify x3 triggers and x3 coping skills to regulate emotions adequately and communicate her thoughts and feelings effectively to others.  Objective A. Pt will use x1-3 coping skills daily to regulate thoughts and emotions.  Objective B. Pt will review mood-altering events in session every 1-4 weeks to process triggers and coping skill applications to gain self-awareness and insights.  Objective C. Pt will maintain medication management as prescribed daily and attend all scheduled appointments (Medication Management: Every 1-3 months & Individual Therapy: Every 1-4 weeks).  Recommended Frequency of Visits: Medication Management: Every 1-3 months Individual Therapy: Every 1-4 weeks  [de-identified] : Barbara is building insights to identify x3 triggers and x3 coping skills that she can use to manage her thoughts and emotions, and to communicate her thoughts and feelings with others. The patient wants to process her emotions and thoughts with the therapist to improve her insights and strengthen her emotional resilience. Her primary stressors are relational conflicts and setting healthy boundaries.   Today, Barbara reported an overall better mood (anxiety 0/10 with no sleep issues) despite recovering from foot surgery and needing to wear boots due to limited mobility. We reviewed and discussed her progress on setting limits at work and receiving adequate support to enhance her recovery and prevent additional injuries, and she made significant progress. Barbara will stay with her current boss until the end of September to support their relocation to [place] and will look for another job. We will discuss her thoughts and feelings about the upcoming changes in the next session. For today, we focused our conversation on proper self-care and practicing relaxation skills to reduce bodily tension caused by the physical stress of her foot injury. Barbara will use her five senses to stay focused on her tasks three times daily. She will also organize her appointments and essential tasks using visual tools such as paper calendars, Post-it Mini Notes, and note-taking.  She will practice tension-release breathing exercises (4-4-6: inhale for 4 seconds, hold the air for 4 seconds, exhale for 6 seconds, 3-5 times daily) and upper body stretches (3-5 times daily) to reduce bodily tension. We will discuss the outcomes in our next session.   Barbara showed making progress toward her treatment goal by reporting feeling better, using x1-2 coping skills to reduce bodily tension (breathing exercises, self-talk, etc.), taking medications as prescribed daily, and attending most of her scheduled appointments (medication management every 1-3 months & individual therapy every 1-3 weeks).    Skills Training (all types):  - Tension-release breathing exercises 4-4-6 (x2 daily): Inhale for 4 seconds, hold the air for 4 seconds, exhale for 4 seconds). - TIPPS (Temperature, cold): Putting ice on the back of her neck, holding ice, washing face with cold water - Problem-solving skills: Reviewing the list of Pros vs. Cons - Using the mantra, "I am not stuck," x3-5 daily to expand her approaches and attitudes toward her work stress.  - Problem-solving skills: Considering options by brainstorming - Using self-talk to self-soothe and prioritize self-care - Threat appraisal skills:1) Big danger? 2) Catastrophic possibilities? 3) More than I can cope with (consider past positive outcomes)?   Recommended services & referrals made: - 3/25/24: [facility] [phone], Pt was receptive - Legal services via C[employer]: [facility],  [facility], [facility]  Support Network (established contact consent/PHI Release): - Emergency and Collateral Contact: Cyndie Morgan, Daughter, [phone]   Follow-up: - Return in 4 week(s).

## 2025-07-01 NOTE — REASON FOR VISIT
[Patient] : Patient [Other: ______] : provided by MIKHAIL [FreeTextEntry4] : 9:27 AM [FreeTextEntry5] : 9:52 AM [TWNoteComboBox1] : Lao [Interpreters_IDNumber] : [id number] [Interpreters_FullName] : Amisha [FreeTextEntry3] : Citizen of [place]  [FreeTextEntry1] : Psychotherapy follow-up visit with the treatment diagnoses of  1) (311) (F32.A) Depression 2) (300.01) (F41.0) Panic disorder

## 2025-07-01 NOTE — PLAN
[[facility]] : [facility]  [Motivational Interviewing] : Motivational Interviewing  [Psychoeducation] : Psychoeducation  [Supportive Therapy] : Supportive Therapy [FreeTextEntry2] : Treatment Goal (effective as of 4/21/25, CGI 4/21/25):  Barbara will identify x3 triggers and x3 coping skills to regulate emotions adequately and communicate her thoughts and feelings effectively to others.  Objective A. Pt will use x1-3 coping skills daily to regulate thoughts and emotions.  Objective B. Pt will review mood-altering events in session every 1-4 weeks to process triggers and coping skill applications to gain self-awareness and insights.  Objective C. Pt will maintain medication management as prescribed daily and attend all scheduled appointments (Medication Management: Every 1-3 months & Individual Therapy: Every 1-4 weeks).  Recommended Frequency of Visits: Medication Management: Every 1-3 months Individual Therapy: Every 1-4 weeks  [de-identified] : Barbara is building insights to identify x3 triggers and x3 coping skills that she can use to manage her thoughts and emotions, and to communicate her thoughts and feelings with others. The patient wants to process her emotions and thoughts with the therapist to improve her insights and strengthen her emotional resilience. Her primary stressors are relational conflicts and setting healthy boundaries.   Today, Barbara reported an overall better mood (anxiety 0/10 with no sleep issues) despite recovering from foot surgery and needing to wear boots due to limited mobility. We reviewed and discussed her progress on setting limits at work and receiving adequate support to enhance her recovery and prevent additional injuries, and she made significant progress. Barbara will stay with her current boss until the end of September to support their relocation to [place] and will look for another job. We will discuss her thoughts and feelings about the upcoming changes in the next session. For today, we focused our conversation on proper self-care and practicing relaxation skills to reduce bodily tension caused by the physical stress of her foot injury. Barbara will use her five senses to stay focused on her tasks three times daily. She will also organize her appointments and essential tasks using visual tools such as paper calendars, Post-it Mini Notes, and note-taking.  She will practice tension-release breathing exercises (4-4-6: inhale for 4 seconds, hold the air for 4 seconds, exhale for 6 seconds, 3-5 times daily) and upper body stretches (3-5 times daily) to reduce bodily tension. We will discuss the outcomes in our next session.   Barbara showed making progress toward her treatment goal by reporting feeling better, using x1-2 coping skills to reduce bodily tension (breathing exercises, self-talk, etc.), taking medications as prescribed daily, and attending most of her scheduled appointments (medication management every 1-3 months & individual therapy every 1-3 weeks).    Skills Training (all types):  - Tension-release breathing exercises 4-4-6 (x2 daily): Inhale for 4 seconds, hold the air for 4 seconds, exhale for 4 seconds). - TIPPS (Temperature, cold): Putting ice on the back of her neck, holding ice, washing face with cold water - Problem-solving skills: Reviewing the list of Pros vs. Cons - Using the mantra, "I am not stuck," x3-5 daily to expand her approaches and attitudes toward her work stress.  - Problem-solving skills: Considering options by brainstorming - Using self-talk to self-soothe and prioritize self-care - Threat appraisal skills:1) Big danger? 2) Catastrophic possibilities? 3) More than I can cope with (consider past positive outcomes)?   Recommended services & referrals made: - 3/25/24: [facility] [phone], Pt was receptive - Legal services via CBO: [facility],  [facility], [facility]  Support Network (established contact consent/PHI Release): - Emergency and Collateral Contact: Cyndie Morgan, Daughter, [phone]   Follow-up: - Return in 4 week(s).

## 2025-07-01 NOTE — REASON FOR VISIT
[Patient] : Patient [Other: ______] : provided by MIKHAIL [FreeTextEntry4] : 9:27 AM [FreeTextEntry5] : 9:52 AM [TWNoteComboBox1] : Welsh [Interpreters_IDNumber] : [id number] [Interpreters_FullName] : Amisha [FreeTextEntry3] : Kuwaiti  [FreeTextEntry1] : Psychotherapy follow-up visit with the treatment diagnoses of  1) (311) (F32.A) Depression 2) (300.01) (F41.0) Panic disorder

## 2025-07-05 NOTE — PHYSICAL EXAM
[Alert] : alert [Well Nourished] : well nourished [No Acute Distress] : no acute distress [Well Developed] : well developed [Normal Sclera/Conjunctiva] : normal sclera/conjunctiva [EOMI] : extra ocular movement intact [No Proptosis] : no proptosis [Normal Oropharynx] : the oropharynx was normal [Thyroid Not Enlarged] : the thyroid was not enlarged [No Respiratory Distress] : no respiratory distress [No Thyroid Nodules] : no palpable thyroid nodules [No Accessory Muscle Use] : no accessory muscle use [Clear to Auscultation] : lungs were clear to auscultation bilaterally [Normal S1, S2] : normal S1 and S2 [Normal Rate] : heart rate was normal [Regular Rhythm] : with a regular rhythm [No Edema] : no peripheral edema [Pedal Pulses Normal] : the pedal pulses are present [Normal Bowel Sounds] : normal bowel sounds [Not Tender] : non-tender [Not Distended] : not distended [Soft] : abdomen soft [Normal Anterior Cervical Nodes] : no anterior cervical lymphadenopathy [Normal Posterior Cervical Nodes] : no posterior cervical lymphadenopathy [No Spinal Tenderness] : no spinal tenderness [Spine Straight] : spine straight [No Stigmata of Cushings Syndrome] : no stigmata of Cushings Syndrome [Normal Gait] : normal gait [Normal Strength/Tone] : muscle strength and tone were normal [No Rash] : no rash [Acanthosis Nigricans] : no acanthosis nigricans [Normal Reflexes] : deep tendon reflexes were 2+ and symmetric [No Tremors] : no tremors [Oriented x3] : oriented to person, place, and time

## 2025-07-29 NOTE — PROCEDURE
Render Note In Bullet Format When Appropriate: No Duration Of Freeze Thaw-Cycle (Seconds): 1 [Plantar Fascia Injection] : ~M plantar fascia (heel) injection [Right Foot] : was performed on the right foot Detail Level: Detailed [Therapeutic] : therapeutic [Patient] : the patient [Risks] : risks [Benefits] : benefits [Alternatives] : alternatives [Ethyl Chloride] : ethyl chloride [___ ml Inj] : [unfilled] ~Uml [1%] : 1%  [Without Epi] : without epinephrine [Kenalog 10] : Kenalog 10 [Betamethasone] : Betamethasone [Tolerated Well] : tolerated the procedure well [No Complications] : There were no complications. [de-identified] : 1 cc of each  Consent: The patient's consent was obtained including but not limited to risks of crusting, scabbing, blistering, scarring, darker or lighter pigmentary change, recurrence, incomplete removal and infection. Post-Care Instructions: I reviewed with the patient in detail post-care instructions. Patient is to wear sunprotection, and avoid picking at any of the treated lesions. Pt may apply Vaseline to crusted or scabbing areas. Number Of Freeze-Thaw Cycles: 2 freeze-thaw cycles

## 2025-07-29 NOTE — PROCEDURE
[Plantar Fascia Injection] : ~M plantar fascia (heel) injection [Right Foot] : was performed on the right foot [Therapeutic] : therapeutic [Patient] : the patient [Risks] : risks [Benefits] : benefits [Alternatives] : alternatives [Ethyl Chloride] : ethyl chloride [___ ml Inj] : [unfilled] ~Uml [1%] : 1%  [Without Epi] : without epinephrine [Kenalog 10] : Kenalog 10 [Betamethasone] : Betamethasone [Tolerated Well] : tolerated the procedure well [No Complications] : There were no complications. [de-identified] : 1 cc of each

## 2025-07-30 NOTE — PHYSICAL EXAM
[Well groomed] : well groomed [Cooperative] : cooperative [Depressed] : depressed [Labile] : labile [Soft] : soft [Linear/Goal Directed] : linear/goal directed [None] : none [None Reported] : none reported [Average] : average [WNL] : within normal limits [de-identified] : less than prior visits [de-identified] : Kinyarwanda speaking [de-identified] : has a negative opinion of medication for psychiatric d/o, seemingly due to stigma

## 2025-07-30 NOTE — HISTORY OF PRESENT ILLNESS
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 [FreeTextEntry2] : Past psychiatric history of 1 [facility] admission in February 2024 for suspected suicide attempt, prior suicide history of one suspected suicide attempt, medication trial history of Paxil 10 mg for unclear time period prior to [facility] admission.  Patient states she was started on Paxil 10 mg for sleep a few years ago (exact time unknown). States she started Paxil coinciding with also starting to have panic attacks around 3 years ago, which she describes as a feeling of increased anxiety, dry mouth, heart beating fast, difficulty breathing. States her panic attacks occur daily. Denies knowing what the triggers are. States they started when she was preparing to travel to [place]. Reports Paxil had initially helped her anxiety and panic attacks but there were times when she took it daily, felt better, and then stopped it which caused rebound anxiety. Denies any other medication trials.   [FreeTextEntry3] : Paxil 10 mg (few years, did not always take daily), Paxil 20 mg (on 20 mg for about 2 weeks). Zoloft 50 mg (on for about 7 weeks; experienced increased anxiety and headaches)

## 2025-07-30 NOTE — PHYSICAL EXAM
[Well groomed] : well groomed [Cooperative] : cooperative [Depressed] : depressed [Labile] : labile [Soft] : soft [Linear/Goal Directed] : linear/goal directed [None] : none [None Reported] : none reported [Average] : average [WNL] : within normal limits [de-identified] : less than prior visits [de-identified] : Nepali speaking [de-identified] : has a negative opinion of medication for psychiatric d/o, seemingly due to stigma

## 2025-07-30 NOTE — DISCUSSION/SUMMARY
[FreeTextEntry1] : Patient is a 57-year-old female, single, originally from [place] but living [place] the [place] since [place], Maori-speaking, has two adult children (son in [place] and daughter in [place]), currently domiciled with daughter and family, formerly employed as a nanny, per chart w/ PMHx of diabetes and obstructive sleep apnea not on CPAP/BiPAP, w/ PPHx of 1 [facility] admission in February 2024 for suspected suicide attempt (OD on nine Tylenol PM - patient denies it was a SA), medication trial history of Paxil 10 mg for unclear time period prior to [facility]PP admission in 2024, per chart no known substance use history, no family psychiatric history, who presents to [facility] for continuing follow-up for medication management.  On continued psychiatric evaluation, patient endorses overall improving mood and improving anxiety in the context of ADLs and stress at work and in the home, there have been no more panic attacks. Denies suicidal, psychotic, manic, delusional, or paranoid ideations. She reported some increased anxiety and headaches on Zoloft, and dry mouth from Paxil, which influenced her decisions to stop taking the medication. Discussed risks and benefits of recurrence of mood and or anxiety symptoms without medication and patient expressed understanding was started on Lexapro to target symptoms, to good effect. Plan to continue Lexapro 10mg at this time. Plan to also continue longitudinal care at the clinic with psychotherapy at this time. Patient is stable for outpatient level of care.

## 2025-07-30 NOTE — PLAN
[No] : No [Medication education provided] : Medication education provided. [FreeTextEntry5] : - c/w Lexapro to 10mg PO daily ---Self discontinued Zoloft (and Trazodone) due to reported side effects amongst reported improved in mood and anxiety  ---Was initially discharged on Paxil from [facility] in 2024, but reported side effects so was cross tapered to Zoloft at [facility] - c/w psychotherapy with therapist at the clinic - Follow up appointment Wed July 30th 930AM

## 2025-07-30 NOTE — HISTORY OF PRESENT ILLNESS
[FreeTextEntry1] : L foot Trauma, laceration, Fracture - injury to lesser toes L foot with Fractures months ago  - No pressing - Pain improving - Waling with regular shoes  R heel pain - pain with 1st step in AM - Rheumatology follow up - increased gabapentin  - Injection last visit helped  - Still some pain - Stretching exercises at home  DM controlled with meds - Last A1c 6.4  - Endo follow up

## 2025-07-30 NOTE — REASON FOR VISIT
[Follow-Up Visit] : a follow-up visit for [Time Spent: ____ minutes] : Total time spent using  services: [unfilled] minutes. The patient's primary language is not English thus required  services. [FreeTextEntry2] : L foot Trauma, laceration, Fracture, Foot pain, DM  [Interpreters_IDNumber] : [id number]

## 2025-07-30 NOTE — ASSESSMENT
[FreeTextEntry1] : Laceration, Fx L foot - Laceration healed, no wound care, sutures removed    Painful plantar fasciitis R foot - Xrays reviewed - Cont stretching - RTO 6 weeks if still pain will consider another injection   DM, controlled with meds - Educated on proper foot care and shoe wear   RTO 2 weeks at [facility]

## 2025-07-30 NOTE — HISTORY OF PRESENT ILLNESS
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 [FreeTextEntry2] : Past psychiatric history of 1 [facility]PP admission in February 2024 for suspected suicide attempt, prior suicide history of one suspected suicide attempt, medication trial history of Paxil 10 mg for unclear time period prior to IPP admission.  Patient states she was started on Paxil 10 mg for sleep a few years ago (exact time unknown). States she started Paxil coinciding with also starting to have panic attacks around 3 years ago, which she describes as a feeling of increased anxiety, dry mouth, heart beating fast, difficulty breathing. States her panic attacks occur daily. Denies knowing what the triggers are. States they started when she was preparing to travel to [place]. Reports Paxil had initially helped her anxiety and panic attacks but there were times when she took it daily, felt better, and then stopped it which caused rebound anxiety. Denies any other medication trials.   [FreeTextEntry3] : Paxil 10 mg (few years, did not always take daily), Paxil 20 mg (on 20 mg for about 2 weeks). Zoloft 50 mg (on for about 7 weeks; experienced increased anxiety and headaches)

## 2025-07-30 NOTE — PHYSICAL EXAM
[2+] : left foot dorsalis pedis 2+ [No Joint Swelling] : no joint swelling [] : normal strength/tone [de-identified] : Mild pain on palpation and ROM  toes 2-3 L foot  pain on palpation medial plantar B/L heels [FreeTextEntry1] : Laceration of nail bed toes 2 and 3 healed  with Vycril suture intact, no drainage, No erythema [Diminished Throughout Right Foot] : normal sensation with monofilament testing throughout right foot [Diminished Throughout Left Foot] : normal sensation with monofilament testing throughout left foot

## 2025-07-30 NOTE — DISCUSSION/SUMMARY
[FreeTextEntry1] : Patient is a 57-year-old female, single, originally from [place], Bulgarian-speaking, has two adult children (son in [place] and daughter in [place]), currently domiciled with daughter and family, formerly employed as a nanny, per chart w/ PMHx of diabetes and obstructive sleep apnea not on CPAP/BiPAP, w/ PPHx of 1 [facility]PP admission in February 2024 for suspected suicide attempt (OD on nine Tylenol PM - patient denies it was a SA), medication trial history of Paxil 10 mg for unclear time period prior to IPP admission in 2024, per chart no known substance use history, no family psychiatric history, who presents to [facility] for continuing follow-up for medication management.  On continued psychiatric evaluation, patient endorses overall improving mood and improving anxiety in the context of ADLs and stress at work and in the home, there have been no more panic attacks. Denies suicidal, psychotic, manic, delusional, or paranoid ideations. She reported some increased anxiety and headaches on Zoloft, and dry mouth from Paxil, which influenced her decisions to stop taking the medication. Discussed risks and benefits of recurrence of mood and or anxiety symptoms without medication and patient expressed understanding was started on Lexapro to target symptoms, to good effect. Plan to continue Lexapro 10mg at this time. Plan to also continue longitudinal care at the clinic with psychotherapy at this time. Patient is stable for outpatient level of care.

## 2025-07-30 NOTE — REASON FOR VISIT
[Follow-Up Visit] : a follow-up visit for [Time Spent: ____ minutes] : Total time spent using  services: [unfilled] minutes. The patient's primary language is not English thus required  services. [FreeTextEntry2] : L foot Trauma, laceration, Fracture, Foot pain, DM  [Interpreters_IDNumber] : [id number]

## 2025-07-30 NOTE — PHYSICAL EXAM
[2+] : left foot dorsalis pedis 2+ [No Joint Swelling] : no joint swelling [] : normal strength/tone [de-identified] : Mild pain on palpation and ROM  toes 2-3 L foot  pain on palpation medial plantar B/L heels [FreeTextEntry1] : Laceration of nail bed toes 2 and 3 healed  with Vycril suture intact, no drainage, No erythema [Diminished Throughout Right Foot] : normal sensation with monofilament testing throughout right foot [Diminished Throughout Left Foot] : normal sensation with monofilament testing throughout left foot

## 2025-07-30 NOTE — ASSESSMENT
[FreeTextEntry1] : Laceration, Fx L foot - Laceration healed, no wound care, sutures removed    Painful plantar fasciitis R foot - Xrays reviewed - Cont stretching - RTO 6 weeks if still pain will consider another injection   DM, controlled with meds - Educated on proper foot care and shoe wear   RTO 2 weeks at [facility]

## 2025-07-30 NOTE — PLAN
[No] : No [Medication education provided] : Medication education provided. [FreeTextEntry5] : - c/w Lexapro to 10mg PO daily ---Self discontinued Zoloft (and Trazodone) due to reported side effects amongst reported improved in mood and anxiety  ---Was initially discharged on Paxil from [facility] in 2024, but reported side effects so was cross tapered to Zoloft at [facility] - c/w psychotherapy with therapist at the clinic - Follow up appointment Wed July 30th 930AM